# Patient Record
Sex: MALE | Race: WHITE | NOT HISPANIC OR LATINO | URBAN - METROPOLITAN AREA
[De-identification: names, ages, dates, MRNs, and addresses within clinical notes are randomized per-mention and may not be internally consistent; named-entity substitution may affect disease eponyms.]

---

## 2018-07-11 ENCOUNTER — INPATIENT (INPATIENT)
Facility: HOSPITAL | Age: 53
LOS: 6 days | Discharge: HOME | End: 2018-07-18
Attending: INTERNAL MEDICINE | Admitting: INTERNAL MEDICINE

## 2018-07-11 VITALS
OXYGEN SATURATION: 96 % | HEART RATE: 118 BPM | TEMPERATURE: 98 F | DIASTOLIC BLOOD PRESSURE: 104 MMHG | SYSTOLIC BLOOD PRESSURE: 158 MMHG | HEIGHT: 73 IN | WEIGHT: 265 LBS | RESPIRATION RATE: 20 BRPM

## 2018-07-11 LAB
APTT BLD: 27.4 SEC — SIGNIFICANT CHANGE UP (ref 27–39.2)
BASOPHILS # BLD AUTO: 0.05 K/UL — SIGNIFICANT CHANGE UP (ref 0–0.2)
BASOPHILS NFR BLD AUTO: 0.7 % — SIGNIFICANT CHANGE UP (ref 0–1)
EOSINOPHIL # BLD AUTO: 0.14 K/UL — SIGNIFICANT CHANGE UP (ref 0–0.7)
EOSINOPHIL NFR BLD AUTO: 1.9 % — SIGNIFICANT CHANGE UP (ref 0–8)
HCT VFR BLD CALC: 42.6 % — SIGNIFICANT CHANGE UP (ref 42–52)
HGB BLD-MCNC: 14.9 G/DL — SIGNIFICANT CHANGE UP (ref 14–18)
IMM GRANULOCYTES NFR BLD AUTO: 0.3 % — SIGNIFICANT CHANGE UP (ref 0.1–0.3)
INR BLD: 1.09 RATIO — SIGNIFICANT CHANGE UP (ref 0.65–1.3)
LACTATE SERPL-SCNC: 2 MMOL/L — SIGNIFICANT CHANGE UP (ref 0.5–2.2)
LYMPHOCYTES # BLD AUTO: 2.55 K/UL — SIGNIFICANT CHANGE UP (ref 1.2–3.4)
LYMPHOCYTES # BLD AUTO: 34.1 % — SIGNIFICANT CHANGE UP (ref 20.5–51.1)
MCHC RBC-ENTMCNC: 29.6 PG — SIGNIFICANT CHANGE UP (ref 27–31)
MCHC RBC-ENTMCNC: 35 G/DL — SIGNIFICANT CHANGE UP (ref 32–37)
MCV RBC AUTO: 84.5 FL — SIGNIFICANT CHANGE UP (ref 80–94)
MONOCYTES # BLD AUTO: 0.64 K/UL — HIGH (ref 0.1–0.6)
MONOCYTES NFR BLD AUTO: 8.6 % — SIGNIFICANT CHANGE UP (ref 1.7–9.3)
NEUTROPHILS # BLD AUTO: 4.07 K/UL — SIGNIFICANT CHANGE UP (ref 1.4–6.5)
NEUTROPHILS NFR BLD AUTO: 54.4 % — SIGNIFICANT CHANGE UP (ref 42.2–75.2)
PLATELET # BLD AUTO: 280 K/UL — SIGNIFICANT CHANGE UP (ref 130–400)
PROTHROM AB SERPL-ACNC: 11.8 SEC — SIGNIFICANT CHANGE UP (ref 9.95–12.87)
RBC # BLD: 5.04 M/UL — SIGNIFICANT CHANGE UP (ref 4.7–6.1)
RBC # FLD: 12.2 % — SIGNIFICANT CHANGE UP (ref 11.5–14.5)
WBC # BLD: 7.47 K/UL — SIGNIFICANT CHANGE UP (ref 4.8–10.8)
WBC # FLD AUTO: 7.47 K/UL — SIGNIFICANT CHANGE UP (ref 4.8–10.8)

## 2018-07-12 DIAGNOSIS — I10 ESSENTIAL (PRIMARY) HYPERTENSION: ICD-10-CM

## 2018-07-12 DIAGNOSIS — Z90.49 ACQUIRED ABSENCE OF OTHER SPECIFIED PARTS OF DIGESTIVE TRACT: Chronic | ICD-10-CM

## 2018-07-12 DIAGNOSIS — K92.2 GASTROINTESTINAL HEMORRHAGE, UNSPECIFIED: ICD-10-CM

## 2018-07-12 DIAGNOSIS — K57.92 DIVERTICULITIS OF INTESTINE, PART UNSPECIFIED, WITHOUT PERFORATION OR ABSCESS WITHOUT BLEEDING: ICD-10-CM

## 2018-07-12 DIAGNOSIS — E78.00 PURE HYPERCHOLESTEROLEMIA, UNSPECIFIED: ICD-10-CM

## 2018-07-12 DIAGNOSIS — E11.9 TYPE 2 DIABETES MELLITUS WITHOUT COMPLICATIONS: ICD-10-CM

## 2018-07-12 LAB
ALBUMIN SERPL ELPH-MCNC: 4.3 G/DL — SIGNIFICANT CHANGE UP (ref 3.5–5.2)
ALP SERPL-CCNC: 56 U/L — SIGNIFICANT CHANGE UP (ref 30–115)
ALT FLD-CCNC: 18 U/L — SIGNIFICANT CHANGE UP (ref 0–41)
ANION GAP SERPL CALC-SCNC: 13 MMOL/L — SIGNIFICANT CHANGE UP (ref 7–14)
ANION GAP SERPL CALC-SCNC: 15 MMOL/L — HIGH (ref 7–14)
APPEARANCE UR: CLEAR — SIGNIFICANT CHANGE UP
AST SERPL-CCNC: 18 U/L — SIGNIFICANT CHANGE UP (ref 0–41)
BASOPHILS # BLD AUTO: 0.05 K/UL — SIGNIFICANT CHANGE UP (ref 0–0.2)
BASOPHILS NFR BLD AUTO: 0.6 % — SIGNIFICANT CHANGE UP (ref 0–1)
BILIRUB SERPL-MCNC: 0.6 MG/DL — SIGNIFICANT CHANGE UP (ref 0.2–1.2)
BILIRUB UR-MCNC: NEGATIVE — SIGNIFICANT CHANGE UP
BUN SERPL-MCNC: 13 MG/DL — SIGNIFICANT CHANGE UP (ref 10–20)
BUN SERPL-MCNC: 14 MG/DL — SIGNIFICANT CHANGE UP (ref 10–20)
CALCIUM SERPL-MCNC: 8.4 MG/DL — LOW (ref 8.5–10.1)
CALCIUM SERPL-MCNC: 9.6 MG/DL — SIGNIFICANT CHANGE UP (ref 8.5–10.1)
CHLORIDE SERPL-SCNC: 95 MMOL/L — LOW (ref 98–110)
CHLORIDE SERPL-SCNC: 98 MMOL/L — SIGNIFICANT CHANGE UP (ref 98–110)
CK MB CFR SERPL CALC: 1.4 NG/ML — SIGNIFICANT CHANGE UP (ref 0.6–6.3)
CK SERPL-CCNC: 110 U/L — SIGNIFICANT CHANGE UP (ref 0–225)
CO2 SERPL-SCNC: 26 MMOL/L — SIGNIFICANT CHANGE UP (ref 17–32)
CO2 SERPL-SCNC: 27 MMOL/L — SIGNIFICANT CHANGE UP (ref 17–32)
COLOR SPEC: YELLOW — SIGNIFICANT CHANGE UP
CREAT SERPL-MCNC: 0.7 MG/DL — SIGNIFICANT CHANGE UP (ref 0.7–1.5)
CREAT SERPL-MCNC: 0.9 MG/DL — SIGNIFICANT CHANGE UP (ref 0.7–1.5)
DIFF PNL FLD: NEGATIVE — SIGNIFICANT CHANGE UP
EOSINOPHIL # BLD AUTO: 0.14 K/UL — SIGNIFICANT CHANGE UP (ref 0–0.7)
EOSINOPHIL NFR BLD AUTO: 1.7 % — SIGNIFICANT CHANGE UP (ref 0–8)
GLUCOSE SERPL-MCNC: 272 MG/DL — HIGH (ref 70–99)
GLUCOSE SERPL-MCNC: 303 MG/DL — HIGH (ref 70–99)
GLUCOSE UR QL: 100 MG/DL
HCT VFR BLD CALC: 32.1 % — LOW (ref 42–52)
HCT VFR BLD CALC: 35.5 % — LOW (ref 42–52)
HCT VFR BLD CALC: 37.5 % — LOW (ref 42–52)
HGB BLD-MCNC: 11.1 G/DL — LOW (ref 14–18)
HGB BLD-MCNC: 12.4 G/DL — LOW (ref 14–18)
HGB BLD-MCNC: 13.3 G/DL — LOW (ref 14–18)
IMM GRANULOCYTES NFR BLD AUTO: 0.4 % — HIGH (ref 0.1–0.3)
KETONES UR-MCNC: NEGATIVE — SIGNIFICANT CHANGE UP
LEUKOCYTE ESTERASE UR-ACNC: NEGATIVE — SIGNIFICANT CHANGE UP
LYMPHOCYTES # BLD AUTO: 2.64 K/UL — SIGNIFICANT CHANGE UP (ref 1.2–3.4)
LYMPHOCYTES # BLD AUTO: 32.2 % — SIGNIFICANT CHANGE UP (ref 20.5–51.1)
MCHC RBC-ENTMCNC: 29.7 PG — SIGNIFICANT CHANGE UP (ref 27–31)
MCHC RBC-ENTMCNC: 30.1 PG — SIGNIFICANT CHANGE UP (ref 27–31)
MCHC RBC-ENTMCNC: 34.9 G/DL — SIGNIFICANT CHANGE UP (ref 32–37)
MCHC RBC-ENTMCNC: 35.5 G/DL — SIGNIFICANT CHANGE UP (ref 32–37)
MCV RBC AUTO: 84.8 FL — SIGNIFICANT CHANGE UP (ref 80–94)
MCV RBC AUTO: 85.1 FL — SIGNIFICANT CHANGE UP (ref 80–94)
MONOCYTES # BLD AUTO: 0.69 K/UL — HIGH (ref 0.1–0.6)
MONOCYTES NFR BLD AUTO: 8.4 % — SIGNIFICANT CHANGE UP (ref 1.7–9.3)
NEUTROPHILS # BLD AUTO: 4.65 K/UL — SIGNIFICANT CHANGE UP (ref 1.4–6.5)
NEUTROPHILS NFR BLD AUTO: 56.7 % — SIGNIFICANT CHANGE UP (ref 42.2–75.2)
NITRITE UR-MCNC: NEGATIVE — SIGNIFICANT CHANGE UP
NRBC # BLD: 0 /100 WBCS — SIGNIFICANT CHANGE UP (ref 0–0)
PH UR: 6 — SIGNIFICANT CHANGE UP (ref 5–8)
PLATELET # BLD AUTO: 251 K/UL — SIGNIFICANT CHANGE UP (ref 130–400)
PLATELET # BLD AUTO: 259 K/UL — SIGNIFICANT CHANGE UP (ref 130–400)
POTASSIUM SERPL-MCNC: 3.7 MMOL/L — SIGNIFICANT CHANGE UP (ref 3.5–5)
POTASSIUM SERPL-MCNC: 3.8 MMOL/L — SIGNIFICANT CHANGE UP (ref 3.5–5)
POTASSIUM SERPL-SCNC: 3.7 MMOL/L — SIGNIFICANT CHANGE UP (ref 3.5–5)
POTASSIUM SERPL-SCNC: 3.8 MMOL/L — SIGNIFICANT CHANGE UP (ref 3.5–5)
PROT SERPL-MCNC: 6.9 G/DL — SIGNIFICANT CHANGE UP (ref 6–8)
PROT UR-MCNC: NEGATIVE MG/DL — SIGNIFICANT CHANGE UP
RBC # BLD: 4.17 M/UL — LOW (ref 4.7–6.1)
RBC # BLD: 4.42 M/UL — LOW (ref 4.7–6.1)
RBC # FLD: 12.1 % — SIGNIFICANT CHANGE UP (ref 11.5–14.5)
RBC # FLD: 12.2 % — SIGNIFICANT CHANGE UP (ref 11.5–14.5)
SODIUM SERPL-SCNC: 137 MMOL/L — SIGNIFICANT CHANGE UP (ref 135–146)
SODIUM SERPL-SCNC: 137 MMOL/L — SIGNIFICANT CHANGE UP (ref 135–146)
SP GR SPEC: 1.01 — SIGNIFICANT CHANGE UP (ref 1.01–1.03)
TROPONIN T SERPL-MCNC: <0.01 NG/ML — SIGNIFICANT CHANGE UP
TYPE + AB SCN PNL BLD: SIGNIFICANT CHANGE UP
UROBILINOGEN FLD QL: 0.2 MG/DL — SIGNIFICANT CHANGE UP (ref 0.2–0.2)
WBC # BLD: 7.85 K/UL — SIGNIFICANT CHANGE UP (ref 4.8–10.8)
WBC # BLD: 8.2 K/UL — SIGNIFICANT CHANGE UP (ref 4.8–10.8)
WBC # FLD AUTO: 7.85 K/UL — SIGNIFICANT CHANGE UP (ref 4.8–10.8)
WBC # FLD AUTO: 8.2 K/UL — SIGNIFICANT CHANGE UP (ref 4.8–10.8)

## 2018-07-12 RX ORDER — CEFEPIME 1 G/1
1000 INJECTION, POWDER, FOR SOLUTION INTRAMUSCULAR; INTRAVENOUS ONCE
Qty: 0 | Refills: 0 | Status: COMPLETED | OUTPATIENT
Start: 2018-07-12 | End: 2018-07-12

## 2018-07-12 RX ORDER — METRONIDAZOLE 500 MG
500 TABLET ORAL ONCE
Qty: 0 | Refills: 0 | Status: COMPLETED | OUTPATIENT
Start: 2018-07-12 | End: 2018-07-12

## 2018-07-12 RX ORDER — METFORMIN HYDROCHLORIDE 850 MG/1
0 TABLET ORAL
Qty: 0 | Refills: 0 | COMMUNITY

## 2018-07-12 RX ORDER — VALSARTAN 80 MG/1
40 TABLET ORAL DAILY
Qty: 0 | Refills: 0 | Status: DISCONTINUED | OUTPATIENT
Start: 2018-07-12 | End: 2018-07-14

## 2018-07-12 RX ORDER — CEFOTETAN DISODIUM 1 G
1 VIAL (EA) INJECTION EVERY 12 HOURS
Qty: 0 | Refills: 0 | Status: DISCONTINUED | OUTPATIENT
Start: 2018-07-12 | End: 2018-07-13

## 2018-07-12 RX ORDER — SODIUM CHLORIDE 9 MG/ML
1000 INJECTION INTRAMUSCULAR; INTRAVENOUS; SUBCUTANEOUS
Qty: 0 | Refills: 0 | Status: DISCONTINUED | OUTPATIENT
Start: 2018-07-12 | End: 2018-07-13

## 2018-07-12 RX ORDER — GEMFIBROZIL 600 MG
0 TABLET ORAL
Qty: 0 | Refills: 0 | COMMUNITY

## 2018-07-12 RX ORDER — AMLODIPINE BESYLATE 2.5 MG/1
2.5 TABLET ORAL DAILY
Qty: 0 | Refills: 0 | Status: DISCONTINUED | OUTPATIENT
Start: 2018-07-12 | End: 2018-07-13

## 2018-07-12 RX ORDER — CEFEPIME 1 G/1
1000 INJECTION, POWDER, FOR SOLUTION INTRAMUSCULAR; INTRAVENOUS EVERY 12 HOURS
Qty: 0 | Refills: 0 | Status: DISCONTINUED | OUTPATIENT
Start: 2018-07-12 | End: 2018-07-12

## 2018-07-12 RX ORDER — METRONIDAZOLE 500 MG
500 TABLET ORAL EVERY 8 HOURS
Qty: 0 | Refills: 0 | Status: DISCONTINUED | OUTPATIENT
Start: 2018-07-12 | End: 2018-07-12

## 2018-07-12 RX ORDER — SODIUM CHLORIDE 9 MG/ML
1000 INJECTION INTRAMUSCULAR; INTRAVENOUS; SUBCUTANEOUS
Qty: 0 | Refills: 0 | Status: DISCONTINUED | OUTPATIENT
Start: 2018-07-12 | End: 2018-07-12

## 2018-07-12 RX ADMIN — Medication 100 MILLIGRAM(S): at 06:02

## 2018-07-12 RX ADMIN — CEFEPIME 100 MILLIGRAM(S): 1 INJECTION, POWDER, FOR SOLUTION INTRAMUSCULAR; INTRAVENOUS at 06:02

## 2018-07-12 RX ADMIN — SODIUM CHLORIDE 75 MILLILITER(S): 9 INJECTION INTRAMUSCULAR; INTRAVENOUS; SUBCUTANEOUS at 22:16

## 2018-07-12 RX ADMIN — Medication 100 GRAM(S): at 17:23

## 2018-07-12 RX ADMIN — VALSARTAN 40 MILLIGRAM(S): 80 TABLET ORAL at 06:34

## 2018-07-12 RX ADMIN — SODIUM CHLORIDE 75 MILLILITER(S): 9 INJECTION INTRAMUSCULAR; INTRAVENOUS; SUBCUTANEOUS at 06:34

## 2018-07-12 RX ADMIN — AMLODIPINE BESYLATE 2.5 MILLIGRAM(S): 2.5 TABLET ORAL at 06:34

## 2018-07-12 NOTE — PROGRESS NOTE ADULT - SUBJECTIVE AND OBJECTIVE BOX
Patient was seen by GI  He is still with rectal bleeding  Hb 12.1 vital signs are stable,   GI recommended to transfer the patient to [facility] for close observation, possible IR for diverticular bleeding or colonoscopy.

## 2018-07-12 NOTE — H&P ADULT - HISTORY OF PRESENT ILLNESS
54yo male presents to the ER due to episodes of rectal bleeding since 1900 last night. Only associated symptom is a gurgling sensation in his stomach prior to each bowel movement. He denies abdominal pain, nausea, vomiting or fevers. No modifying factors except less bowel movements after ER intervention including iv antibiotics. He denies any history of diverticulitis but both his mother and brother have had it. Patient denies any use of blood thinners such as aspirin 54yo male presents to the ER due to episodes of rectal bleeding since 1900 last night including episodes in our ER. Only associated symptom is a gurgling sensation in his stomach prior to each bowel movement. He denies abdominal pain, nausea, vomiting or fevers. No modifying factors except less bowel movements after ER intervention including iv antibiotics. He denies any history of diverticulitis but both his mother and brother have had it. Patient denies any use of blood thinners such as aspirin.

## 2018-07-12 NOTE — CONSULT NOTE ADULT - SUBJECTIVE AND OBJECTIVE BOX
This is a   admitted with  GI BLEED  53 year old male patient with HTN,DM, Hypertriglyceridemia admitted with painless rectal bleeding since last night. has had 10-12 bloody BM in hospital. No previous episodes.            Current Health Issues  GI BLEED  ^RECTAL BLEED  Handoff  MEWS Score  Diabetes mellitus, type 2  Hypertension  High blood cholesterol  GI bleed  High blood cholesterol  Hypertension, unspecified type  Type 2 diabetes mellitus without complication, without long-term current use of insulin  Diverticulitis  Gastrointestinal hemorrhage, unspecified gastrointestinal hemorrhage type  S/P appendectomy  RECTAL BLEED  Diverticulitis          No Known Allergies          Home Medications:  gemfibrozil:  (12 Jul 2018 06:45)  metFORMIN:  (12 Jul 2018 06:45)  Tribenzor: just switched off this medication (12 Jul 2018 06:45)          FAMILY HISTORY:      REVIEW OF SYSTEMS      General:	Nil    Skin/Breast:  	  Ophthalmologic:  	  ENMT:	    Respiratory and Thorax:Nil  	  Cardiovascular:	Nil    Gastrointestinal:	    Genitourinary:	    Musculoskeletal:	    Neurological:	    Psychiatric:	    Hematology/Lymphatics:	    Endocrine:	    Allergic/Immunologic:	      PHYSICAL EXAM:    GENERAL:   in no distress    T(C): 37.1 (07-12-18 @ 13:02), Max: 37.1 (07-12-18 @ 13:02)  HR: 91 (07-12-18 @ 13:02) (85 - 118)  BP: 127/63 (07-12-18 @ 13:02) (124/84 - 158/104)  RR: 16 (07-12-18 @ 05:11) (16 - 20)  SpO2: 100% (07-12-18 @ 03:13) (96% - 100%)      HEENT:  NC/AT,  anicteric  CHEST:   no increased effort, breath sounds clear  HEART:  Regular rate and  rhythm  ABDOMEN:  Soft, non-tender, non-distended, normoactive bowel sounds,  no masses ,no hepato-splenomegaly, no signs of chronic liver disease  EXTREMETIES :  no clubbing cyanosis or edema    MICROBIOLOGY      DATA  CBC Full  -  ( 12 Jul 2018 06:49 )  WBC Count : 7.85 K/uL  Hemoglobin : 12.4 g/dL  Hematocrit : 35.5 %  Platelet Count - Automated : 259 K/uL  Mean Cell Volume : 85.1 fL  Mean Cell Hemoglobin : 29.7 pg  Mean Cell Hemoglobin Concentration : 34.9 g/dL  Auto Neutrophil # : x  Auto Lymphocyte # : x  Auto Monocyte # : x  Auto Eosinophil # : x  Auto Basophil # : x  Auto Neutrophil % : x  Auto Lymphocyte % : x  Auto Monocyte % : x  Auto Eosinophil % : x  Auto Basophil % : x      PT/INR - ( 11 Jul 2018 23:06 )   PT: 11.80 sec;   INR: 1.09 ratio         PTT - ( 11 Jul 2018 23:06 )  PTT:27.4 sec    07-12    137  |  98  |  13  ----------------------------<  272<H>  3.8   |  26  |  0.7    Ca    8.4<L>      12 Jul 2018 06:49    TPro  6.9  /  Alb  4.3  /  TBili  0.6  /  DBili  x   /  AST  18  /  ALT  18  /  AlkPhos  56  07-11                  < from: CT Angio Abdomen and Pelvis w/ IV Cont (07.12.18 @ 01:05) >    No CT evidence for intraluminal contrast extravasation to localize a site   of active GI bleed..    Very mild, uncomplicated, diverticulitis of the sigmoid colon.      < end of copied text >  < from: CT Angio Abdomen and Pelvis w/ IV Cont (07.12.18 @ 01:05) >    No CT evidence for intraluminal contrast extravasation to localize a site   of active GI bleed..    Very mild, uncomplicated, diverticulitis of the sigmoid colon.      < end of copied text >

## 2018-07-12 NOTE — H&P ADULT - NSHPLABSRESULTS_GEN_ALL_CORE
< from: CT Angio Abdomen and Pelvis w/ IV Cont (18 @ 01:05) >    ******PRELIMINARY REPORT******    ******PRELIMINARY REPORT******          EXAM:  CT ANGIO ABD PELV (W)AW IC          PROCEDURE DATE:  2018      IMPRESSION:     No evidence of an active arterial intraluminal hemorrhage.    Uncomplicated proximal sigmoid colon diverticulitis    ******PRELIMINARY REPORT******    ******PRELIMINARY REPORT******          SHALINI POTTER M.D., RESIDENT RADIOLOGIST    < end of copied text >                          13.3   8.20  )-----------( 251      ( 2018 02:37 )             37.5     07-    137  |  95<L>  |  14  ----------------------------<  303<H>  3.7   |  27  |  0.9    Ca    9.6      2018 23:06    TPro  6.9  /  Alb  4.3  /  TBili  0.6  /  DBili  x   /  AST  18  /  ALT  18  /  AlkPhos  56  07-11          Urinalysis Basic - ( 2018 23:45 )    Color: Yellow / Appearance: Clear / S.010 / pH: x  Gluc: x / Ketone: Negative  / Bili: Negative / Urobili: 0.2 mg/dL   Blood: x / Protein: Negative mg/dL / Nitrite: Negative   Leuk Esterase: Negative / RBC: x / WBC x   Sq Epi: x / Non Sq Epi: x / Bacteria: x      PT/INR - ( 2018 23:06 )   PT: 11.80 sec;   INR: 1.09 ratio         PTT - ( 2018 23:06 )  PTT:27.4 sec  Lactate Trend   @ 23:17 Lactate:2.0     CARDIAC MARKERS ( 2018 23:08 )  x     / <0.01 ng/mL / 110 U/L / x     / 1.4 ng/mL      CAPILLARY BLOOD GLUCOSE

## 2018-07-12 NOTE — ED PROVIDER NOTE - PHYSICAL EXAMINATION
CONSTITUTIONAL: Well-developed; well-nourished; in no acute distress.   SKIN: warm, dry  HEAD: Normocephalic; atraumatic.  EYES: PERRL, EOMI, no conjunctival erythema  ENT: No nasal discharge; airway clear.  NECK: Supple; non tender.  CARD: S1, S2 normal; no murmurs, gallops, or rubs. Regular rate and rhythm.   RESP: No wheezes, rales or rhonchi.  ABD: soft ntnd  VIVIANE: internal hemorrhoid palpated at 9 o clock,  no elpidio blood   EXT: Normal ROM.  No clubbing, cyanosis or edema.   NEURO: Alert, oriented, grossly unremarkable  PSYCH: Cooperative, appropriate.

## 2018-07-12 NOTE — ED PROVIDER NOTE - NS ED ROS FT
Eyes:  No visual changes, eye pain or discharge.  ENMT:  No hearing changes, pain, no sore throat or runny nose, no difficulty swallowing  Cardiac:  No chest pain, SOB or edema. No chest pain with exertion.  Respiratory:  No cough or respiratory distress. No hemoptysis. No history of asthma or RAD.  GI:  rectal bleeding   :  No dysuria, frequency or burning.  MS:  No myalgia, muscle weakness, joint pain or back pain.  Neuro:  No headache or weakness.  No LOC.  Skin:  No skin rash.   Endocrine: No history of thyroid disease or diabetes.

## 2018-07-12 NOTE — ED PROVIDER NOTE - OBJECTIVE STATEMENT
52 yo M w hxo f DM, HTN, c/o brbpr that started around 7 pm.  Not associated with n/v.  No abdominal pain.  No chest pain or sob.  no weakness or dizziness.  Never similar symptoms.  No colonoscopy.  No recent unintentional weight loss.  No change in caliber of stools

## 2018-07-12 NOTE — ED PROVIDER NOTE - ATTENDING CONTRIBUTION TO CARE
54 yo M PMHx DM, HTN presents with c/o bright red blood since this evening, multiple episodes including 2 in ED.  Pt states he has abd cramps and them goes to the bathroom.  no n/v, no fever or chills,  On exam pt in NAD AAO x 3, well appearing, abd is soft + BS, NT ND

## 2018-07-12 NOTE — CONSULT NOTE ADULT - SUBJECTIVE AND OBJECTIVE BOX
JUVENTINO PERALTA 53yMalePatient is a 53y old  Male who presents with a chief complaint of rectal bleed (2018 04:59)      Patient has history of:  No Known Allergies      PMH - DM   no prior colonoscopy    FSH - not contributory    ROS - not relevant     PHYSICAL EXAM  T(F): 96.9 (18 @ 05:11), Max: 97.9 (18 @ 22:32)  HR: 85 (18 @ 05:11) (85 - 118)  BP: 124/84 (18 @ 05:11) (124/84 - 158/104)  RR: 16 (18 @ 05:11) (16 - 20)  SpO2: 100% (18 @ 03:13) (96% - 100%)  Daily Height in cm: 185.42 (2018 05:11)    Daily   HEENT: normal, no nuchal rigidity  Cor: RSR Nl S1 S2  Lungs: clear  Abdomen: Nontender, Nl BS, obeses  Ext: No clubbing,cyanosis or edema    LAB & RADIOLOGIC RESULTS:                        13.3   8.20  )-----------( 251      ( 2018 02:37 )             37.5         07-    137  |  95<L>  |  14  ----------------------------<  303<H>  3.7   |  27  |  0.9    Ca    9.6      2018 23:06    TPro  6.9  /  Alb  4.3  /  TBili  0.6  /  DBili  x   /  AST  18  /  ALT  18  /  AlkPhos  56      Sodium, Serum: 137 mmol/L (18 @ 23:06)     Creatinine, Serum: 0.9 mg/dL (18 @ 23:06)  eGFR if Non African American: 98 mL/min/1.73M2 (18 @ 23:06)  eGFR if : 113 mL/min/1.73M2 (18 @ 23:06)    LIVER FUNCTIONS - ( 2018 23:06 )  Alb: 4.3 g/dL / Pro: 6.9 g/dL / ALK PHOS: 56 U/L / ALT: 18 U/L / AST: 18 U/L / GGT: x           PT/INR - ( 2018 23:06 )   PT: 11.80 sec;   INR: 1.09 ratio         PTT - ( 2018 23:06 )  PTT:27.4 sec  Patient Profile Adult [PRAVIN Jordan] (18 @ 04:59)  ED Provider Note [MIN Brewer M. DiMare] (18 @ 00:21)  ED ADULT Nurse Note [PARAS Phelan] (18 @ 23:26)  ED ADULT Triage Note [PATRICIA Hughes] (18 @ 22:32)      CAT Abdomen/Pelvis -  diverticulitis - mild         Urinalysis Basic - ( 2018 23:45 )    Color: Yellow / Appearance: Clear / S.010 / pH: x  Gluc: x / Ketone: Negative  / Bili: Negative / Urobili: 0.2 mg/dL   Blood: x / Protein: Negative mg/dL / Nitrite: Negative   Leuk Esterase: Negative / RBC: x / WBC x   Sq Epi: x / Non Sq Epi: x / Bacteria: x            IMPRESSION  Severe Sepsis (temp>101F, temp<96.8F, pulse>90 beats/min , resp rate>20/min, wbc>12, wbc<4, >10% bands, decreased systolic bp, lactic acidosis, acute kidney injury, metabolic encephalopathy ) due to suspected Gram negative pneumonia, suspected aspiration pneumonia, urinary tract infection      SUGGESTIONs  Await blood cultures, urine culture, wound culture  Await susceptibilities of  Continue Rocephin & Zithromax  Switch to Cefepime  Repeat blood cultures  Repeat sodium, white blood cell count    Echocardiogram

## 2018-07-12 NOTE — ED PROVIDER NOTE - PROGRESS NOTE DETAILS
pt continues to have multiple bloody bm in the ED signed out to Dr Blanchard pt w diverticular bleed, will start abx,  pt rejected by ICU, will place in low risk telemetry.  GI called, awaiting a call back Case endorsed to me by Dr. Holguin --pending CTA for BRBPR-- found to have diverticulitis, likely diverticular bleed. HR improved, repeat Hgb 13.3 from 14.9. Patient hemodynamically stable but given drop in Hgb case discussed with ICU on call attending who declined to admit, recommends admission to low risk tele. Patient admitted, abx given, GI paged x 2, will continue to attempt to contact as patient would likely benefit from scope within 12-24 hours. spoke to Dr. العراقي, will follow pt on the floor

## 2018-07-12 NOTE — CONSULT NOTE ADULT - PROBLEM SELECTOR RECOMMENDATION 9
mild  afebrile and normal WBC     GI / Surgical eval    IV to PO abx  - at least 10 days of abx  recall if needed

## 2018-07-12 NOTE — CONSULT NOTE ADULT - ASSESSMENT
Patient is still having 10-12 bloody BM today. Clinically stable.  Will like to [place] to [place] [facility] in anticipation of the need for Ir intervention Have discussed case with primary hospitalist earlier on in the day regarding transfer to [place]  If stops bleeding to consider colonoscopy after reviewing imaging with radiologist as CT indicates diverticulitis  Monitor CBC Q 8 hrly.

## 2018-07-13 LAB
BLD GP AB SCN SERPL QL: SIGNIFICANT CHANGE UP
CULTURE RESULTS: NO GROWTH — SIGNIFICANT CHANGE UP
HCT VFR BLD CALC: 23.6 % — LOW (ref 42–52)
HCT VFR BLD CALC: 25.7 % — LOW (ref 42–52)
HGB BLD-MCNC: 8.2 G/DL — LOW (ref 14–18)
HGB BLD-MCNC: 9.1 G/DL — LOW (ref 14–18)
MCHC RBC-ENTMCNC: 29.4 PG — SIGNIFICANT CHANGE UP (ref 27–31)
MCHC RBC-ENTMCNC: 29.9 PG — SIGNIFICANT CHANGE UP (ref 27–31)
MCHC RBC-ENTMCNC: 34.7 G/DL — SIGNIFICANT CHANGE UP (ref 32–37)
MCHC RBC-ENTMCNC: 35.4 G/DL — SIGNIFICANT CHANGE UP (ref 32–37)
MCV RBC AUTO: 84.5 FL — SIGNIFICANT CHANGE UP (ref 80–94)
MCV RBC AUTO: 84.6 FL — SIGNIFICANT CHANGE UP (ref 80–94)
NRBC # BLD: 0 /100 WBCS — SIGNIFICANT CHANGE UP (ref 0–0)
NRBC # BLD: 0 /100 WBCS — SIGNIFICANT CHANGE UP (ref 0–0)
PLATELET # BLD AUTO: 195 K/UL — SIGNIFICANT CHANGE UP (ref 130–400)
PLATELET # BLD AUTO: 218 K/UL — SIGNIFICANT CHANGE UP (ref 130–400)
RBC # BLD: 2.79 M/UL — LOW (ref 4.7–6.1)
RBC # BLD: 3.04 M/UL — LOW (ref 4.7–6.1)
RBC # FLD: 12.1 % — SIGNIFICANT CHANGE UP (ref 11.5–14.5)
RBC # FLD: 12.4 % — SIGNIFICANT CHANGE UP (ref 11.5–14.5)
SPECIMEN SOURCE: SIGNIFICANT CHANGE UP
TYPE + AB SCN PNL BLD: SIGNIFICANT CHANGE UP
WBC # BLD: 7 K/UL — SIGNIFICANT CHANGE UP (ref 4.8–10.8)
WBC # BLD: 8.84 K/UL — SIGNIFICANT CHANGE UP (ref 4.8–10.8)
WBC # FLD AUTO: 7 K/UL — SIGNIFICANT CHANGE UP (ref 4.8–10.8)
WBC # FLD AUTO: 8.84 K/UL — SIGNIFICANT CHANGE UP (ref 4.8–10.8)

## 2018-07-13 RX ORDER — SODIUM CHLORIDE 9 MG/ML
1000 INJECTION, SOLUTION INTRAVENOUS
Qty: 0 | Refills: 0 | Status: DISCONTINUED | OUTPATIENT
Start: 2018-07-13 | End: 2018-07-18

## 2018-07-13 RX ORDER — DEXTROSE 50 % IN WATER 50 %
15 SYRINGE (ML) INTRAVENOUS ONCE
Qty: 0 | Refills: 0 | Status: DISCONTINUED | OUTPATIENT
Start: 2018-07-13 | End: 2018-07-18

## 2018-07-13 RX ORDER — VALSARTAN 80 MG/1
1 TABLET ORAL
Qty: 0 | Refills: 0 | COMMUNITY
Start: 2018-07-13

## 2018-07-13 RX ORDER — PANTOPRAZOLE SODIUM 20 MG/1
1 TABLET, DELAYED RELEASE ORAL
Qty: 30 | Refills: 0 | OUTPATIENT
Start: 2018-07-13 | End: 2018-08-11

## 2018-07-13 RX ORDER — INSULIN GLARGINE 100 [IU]/ML
10 INJECTION, SOLUTION SUBCUTANEOUS AT BEDTIME
Qty: 0 | Refills: 0 | Status: DISCONTINUED | OUTPATIENT
Start: 2018-07-13 | End: 2018-07-14

## 2018-07-13 RX ORDER — DEXTROSE 50 % IN WATER 50 %
25 SYRINGE (ML) INTRAVENOUS ONCE
Qty: 0 | Refills: 0 | Status: DISCONTINUED | OUTPATIENT
Start: 2018-07-13 | End: 2018-07-18

## 2018-07-13 RX ORDER — INSULIN LISPRO 100/ML
VIAL (ML) SUBCUTANEOUS
Qty: 0 | Refills: 0 | Status: DISCONTINUED | OUTPATIENT
Start: 2018-07-13 | End: 2018-07-14

## 2018-07-13 RX ORDER — FERROUS SULFATE 325(65) MG
325 TABLET ORAL DAILY
Qty: 0 | Refills: 0 | Status: DISCONTINUED | OUTPATIENT
Start: 2018-07-13 | End: 2018-07-16

## 2018-07-13 RX ORDER — GLUCAGON INJECTION, SOLUTION 0.5 MG/.1ML
1 INJECTION, SOLUTION SUBCUTANEOUS ONCE
Qty: 0 | Refills: 0 | Status: DISCONTINUED | OUTPATIENT
Start: 2018-07-13 | End: 2018-07-18

## 2018-07-13 RX ORDER — FERROUS SULFATE 325(65) MG
1 TABLET ORAL
Qty: 30 | Refills: 0 | OUTPATIENT
Start: 2018-07-13 | End: 2018-08-11

## 2018-07-13 RX ORDER — DOCUSATE SODIUM 100 MG
1 CAPSULE ORAL
Qty: 60 | Refills: 0 | OUTPATIENT
Start: 2018-07-13 | End: 2018-08-11

## 2018-07-13 RX ORDER — INSULIN LISPRO 100/ML
3 VIAL (ML) SUBCUTANEOUS
Qty: 0 | Refills: 0 | Status: DISCONTINUED | OUTPATIENT
Start: 2018-07-13 | End: 2018-07-14

## 2018-07-13 RX ORDER — PANTOPRAZOLE SODIUM 20 MG/1
40 TABLET, DELAYED RELEASE ORAL
Qty: 0 | Refills: 0 | Status: DISCONTINUED | OUTPATIENT
Start: 2018-07-13 | End: 2018-07-14

## 2018-07-13 RX ORDER — DOCUSATE SODIUM 100 MG
100 CAPSULE ORAL
Qty: 0 | Refills: 0 | Status: DISCONTINUED | OUTPATIENT
Start: 2018-07-13 | End: 2018-07-16

## 2018-07-13 RX ORDER — DEXTROSE 50 % IN WATER 50 %
12.5 SYRINGE (ML) INTRAVENOUS ONCE
Qty: 0 | Refills: 0 | Status: DISCONTINUED | OUTPATIENT
Start: 2018-07-13 | End: 2018-07-18

## 2018-07-13 RX ADMIN — Medication 100 GRAM(S): at 06:00

## 2018-07-13 RX ADMIN — PANTOPRAZOLE SODIUM 40 MILLIGRAM(S): 20 TABLET, DELAYED RELEASE ORAL at 12:47

## 2018-07-13 RX ADMIN — SODIUM CHLORIDE 75 MILLILITER(S): 9 INJECTION INTRAMUSCULAR; INTRAVENOUS; SUBCUTANEOUS at 11:38

## 2018-07-13 RX ADMIN — Medication 325 MILLIGRAM(S): at 23:31

## 2018-07-13 RX ADMIN — Medication 1 TABLET(S): at 23:31

## 2018-07-13 RX ADMIN — VALSARTAN 40 MILLIGRAM(S): 80 TABLET ORAL at 06:00

## 2018-07-13 RX ADMIN — Medication 100 MILLIGRAM(S): at 23:31

## 2018-07-13 RX ADMIN — AMLODIPINE BESYLATE 2.5 MILLIGRAM(S): 2.5 TABLET ORAL at 06:00

## 2018-07-13 RX ADMIN — INSULIN GLARGINE 10 UNIT(S): 100 INJECTION, SOLUTION SUBCUTANEOUS at 23:30

## 2018-07-13 NOTE — DISCHARGE NOTE ADULT - CARE PROVIDERS DIRECT ADDRESSES
,[email or web address]

## 2018-07-13 NOTE — PROGRESS NOTE ADULT - ASSESSMENT
52yo male with history of HTN, DLD, DM presented to [facility] ER on 11 july at around 11:30 pm due to episodes of rectal bleeding that started on same day since 7:00 pm. He had further 10-12 episodes of BRBPR associated with a drop in Hb from 14.9 to 12.7.  His vital signs were stable. Pt underwent CT Angio of abdomen which did not show any Intraluminal GI bleeding but revealed a very mild sigmoid diverticulitis.  He was transferred from [facility] to [place] site in anticipation that if he continues to bleed he might need an IR intervention.  Currently pt denies any BRBPR since last night. His last BM was yesterday at around 8:00 pm where he reported one episode of dark black coloured stool with no bright red blood.     Painless Lower GI Bleed likely sec to diverticulosis- resolved   Keep Npo for now   continue to trend CBC, transfuse as needed.  Will defer colonoscopy for now as pt has mild diverticulitis which increases the risk of perforation. If he rebleeds pt might possibly need embolization by IR  Will rec colonoscopy once diverticulitis is resolved    Mild Uncomplicated diverticulitis-  C/w IV ABX as per ID    Will discuss with attending.

## 2018-07-13 NOTE — PROGRESS NOTE ADULT - SUBJECTIVE AND OBJECTIVE BOX
HPI  52yo male with history of HTN, DLD, DM presented to  ER on 11 july at around 11:30 pm due to episodes of rectal bleeding that started on same day since 1900. He had further episodes of BRBPR in our ER. Only associated symptom is a gurgling sensation in his stomach prior to each bowel movement. He denies abdominal pain, nausea, vomiting or fevers. No prior history of GI bleeding. Pt never had EGD and colonoscopy.  He denies any history of diverticulitis in the past. Patient denies any use of NSAIDS and blood thinners such as aspirin.   He was initially admitted to [facility] where he continued to have BRBPR underwent CT Angio which did not show any intraluminal bleeding but showed very mild diverticulitis in sigmoid colon. Pt was started on IV antibiotics.   Yesterday pt also reported one episode of dark black coloured stool with no bright red blood.   Currently pt denies any BRBPR in the last 12 hours.       PAST MEDICAL & SURGICAL HISTORY  Diabetes mellitus, type 2  Hypertension  High blood cholesterol  S/P appendectomy      ALLERGIES:  No Known Allergies      MEDICATIONS:  MEDICATIONS  (STANDING):  amLODIPine   Tablet 2.5 milliGRAM(s) Oral daily  cefoTEtan  IVPB 1 Gram(s) IV Intermittent every 12 hours  sodium chloride 0.9%. 1000 milliLiter(s) (75 mL/Hr) IV Continuous <Continuous>  valsartan 40 milliGRAM(s) Oral daily    MEDICATIONS  (PRN):      REVIEW OF SYSTEMS:    CONSTITUTIONAL: No weakness, fatigue, malaise, fevers or chills, no weight change, appetite change  Throat: No Dysphagia, No Odynophagia  RESPIRATORY: No SOB  CARDIOVASCULAR: No chest pain   Muscloskeletal: no joints pain  NEUROLOGICAL: No syncope or diziness  SKIN: No pruritis  Heme/Lymph: no LN enlargement         VITALS:   T(F): 97.9 (07-13 @ 05:10), Max: 98.8 (07-12 @ 21:00)  HR: 92 (07-13 @ 05:10) (84 - 118)  BP: 126/95 (07-13 @ 05:10) (121/78 - 158/104)  BP(mean): --  RR: 20 (07-13 @ 05:10) (16 - 20)  SpO2: 97% (07-12 @ 22:16) (96% - 100%)        PHYSICAL EXAM:  EYES: No scleral icterus   LUNG: Clear to auscultation bilaterally; No rales, rhonchi, wheezing, or rubs  HEART: RRR; No murmurs  ABDOMEN: Soft, +BS, NO  Abdominal Tenderness, No guarding, No Sánchez Sign   Rectal Exam: Rectal vault empty , finger stained Bright red blood, no masses palpable.        Blood Work :                        12.4   7.85  )-----------( 259      ( 12 Jul 2018 06:49 )             35.5     PT/INR - ( 11 Jul 2018 23:06 )  INR: 1.09          PTT - ( 11 Jul 2018 23:06 )  PTT:27.4   07-12    137  |  98  |  13  ----------------------------<  272<H>  3.8   |  26  |  0.7    Ca    8.4<L>      12 Jul 2018 06:49      CBC -  ( 12 Jul 2018 06:49 )  Hemoglobin : 12.4    CBC -  ( 12 Jul 2018 04:50 )  Hemoglobin : 11.1    CBC -  ( 12 Jul 2018 02:37 )  Hemoglobin : 13.3    CBC -  ( 11 Jul 2018 23:06 )  Hemoglobin : 14.9      LIVER FUNCTIONS - ( 11 Jul 2018 23:06 )  Alb: 4.3 [3.5 - 5.2] / Pro: 6.9 [6.0 - 8.0] / ALK PHOS: 56 [30 - 115] / ALT: 18 [0 - 41] / AST: 18 [0 - 41] / GGT: x         RADIOLOGY:  < from: CT Angio Abdomen and Pelvis w/ IV Cont (07.12.18 @ 01:05) >  No CT evidence for intraluminal contrast extravasation to localize a site   of active GI bleed..    Very mild, uncomplicated, diverticulitis of the sigmoid colon.    < end of copied text >

## 2018-07-13 NOTE — DISCHARGE NOTE ADULT - PLAN OF CARE
Control bleeding Follow up with gastroenterologist within 1 week of discharge, schedule colonoscopy in 6 weeks. Adjust diet as discussed, with moderate fruits and vegetables and adequate hydration. Continue iron and multivitamin as above. Complete resolution and prevention of recurrence Rectal bleeding stopped and Hb is stable  Follow up with gastroenterologist within 1 week of discharge, schedule colonoscopy in 6 weeks. Adjust diet as discussed, high in fiber with moderate fruits and vegetables and adequate hydration.

## 2018-07-13 NOTE — DISCHARGE NOTE ADULT - MEDICATION SUMMARY - MEDICATIONS TO TAKE
I will START or STAY ON the medications listed below when I get home from the hospital:    valsartan 40 mg oral tablet  -- 1 tab(s) by mouth once a day  -- Indication: For Hypertension    metFORMIN  -- Indication: For Diabetes mellitus, type 2    gemfibrozil  -- Indication: For DLD    Tribenzor  -- just switched off this medication  -- Indication: For Diabetes mellitus, type 2

## 2018-07-13 NOTE — PROGRESS NOTE ADULT - SUBJECTIVE AND OBJECTIVE BOX
FABIAN JUVENTINO  53y  Male  ***My note supercedes ALL resident notes that I sign.  My corrections for their notes are in my note.***    I can be reached directly on [id number]. My office number is [phone]. My personal cell number is [phone].    PMD - Kb Peloro    INTERVAL EVENTS: Had long d/w pt and girlfriend re diverticulosis. There is zero evid pt has diverticulitis, clinically. Last bleed was 9pm last night.     T(F): 98.1 (18 @ 14:27), Max: 98.8 (18 @ 21:00)  HR: 98 (18 @ 14:27) (92 - 105)  BP: 123/68 (18 @ 14:27) (121/78 - 126/95)  RR: 18 (18 @ 14:27) (18 - 20)  SpO2: 96% (18 @ 08:21) (96% - 97%)    PHYSICAL EXAM:  EYES: No scleral icterus   neck no nodes  LUNG: Clear to auscultation bilaterally;  HEART: RRR; No murmurs  ABDOMEN: Soft, +BS, NO  Abdominal Tenderness, No guarding, No masses  ext no edema, no c/c  neuro - cn intact; motor/sens intact, gait nl    LABS:                        9.1     (    84.5   8.84  )-----------( ---------      218      ( 2018 07:38 )             25.7    (    12.4     Hemoglobin: 9.1 g/dL ( @ 07:38) - prob dilutional from IVFs; bleeding stopped almost 24 hrs now  Hemoglobin: 12.4 g/dL ( @ 06:49)  Hemoglobin: 11.1 g/dL ( @ 04:50)  Hemoglobin: 13.3 g/dL ( @ 02:37)  Hemoglobin: 14.9 g/dL ( @ 23:06)    PT/INR - ( 2018 23:06 )   PT: 11.80 sec;   INR: 1.09 ratio    PTT - ( 2018 23:06 )  PTT:27.4 sec    CARDIAC MARKERS ( 2018 23:08 )  x     / <0.01 ng/mL / 110 U/L / x     / 1.4 ng/mL    Urinalysis Basic - ( 2018 23:45 )    Color: Yellow / Appearance: Clear / S.010 / pH: x  Gluc: x / Ketone: Negative  / Bili: Negative / Urobili: 0.2 mg/dL   Blood: x / Protein: Negative mg/dL / Nitrite: Negative   Leuk Esterase: Negative / RBC: x / WBC x   Sq Epi: x / Non Sq Epi: x / Bacteria: x    RADIOLOGY & ADDITIONAL TESTS:  < from: CT Angio Abdomen and Pelvis w/ IV Cont (18 @ 01:05) >  EXAM:  CT ANGIO ABD PELV (W)AW IC          *** ADDENDUM 2018  ***    Upon further evaluation, the previously noted mild inflammation around   the sigmoid colon is likely related to prominent vasa recta.    There is no evidence of diverticulitis.    < end of copied text >  < from: CT Angio Abdomen and Pelvis w/ IV Cont (18 @ 01:05) >  IMPRESSION:     No CT evidencefor intraluminal contrast extravasation to localize a site   of active GI bleed..    < end of copied text >      MEDICATIONS:    insulin glargine Injectable (LANTUS) 10 Unit(s) SubCutaneous at bedtime  insulin lispro (HumaLOG) corrective regimen sliding scale   SubCutaneous three times a day before meals  insulin lispro Injectable (HumaLOG) 3 Unit(s) SubCutaneous three times a day before meals  pantoprazole    Tablet 40 milliGRAM(s) Oral before breakfast  valsartan 40 milliGRAM(s) Oral daily

## 2018-07-13 NOTE — DISCHARGE NOTE ADULT - OTHER SIGNIFICANT FINDINGS
GEN Lying in no acute distress  HEENT Pupils equal and reactive to light and accommodationSupple Neck  PULM Clear to auscultation bilaterally  CV s1s2 regular rate and rhythm  GI + bowel sounds nontnender, obese  EXT no cyanosis or edema  PSYCH awake alert and oriented x 3  INTEG No Lesions  NEURO PENA

## 2018-07-13 NOTE — DISCHARGE NOTE ADULT - PATIENT PORTAL LINK FT
You can access the [facility] Patient Portal, offered by [facility], by registering with the following website: [email or web address]

## 2018-07-13 NOTE — PROGRESS NOTE ADULT - ASSESSMENT
# NO DIVERTICULITIS - stop IVFs; stop ABX  pt can be scoped as outpt at anytime, see no need to wait 6 weeks and so no rush to do c-scope (it could wait 6 wks, too)    # Anemia is from acute blood loss and dilutional  d/c IVfs  fe 325mg po q24, colace 100 q12, MVI q24 til hgb > 10    # LGIB - likely diverticulosis  decent fiber diet; oral hydration  begin clears  adv frankie as jessy  if no more bloody Bms and Hgb rising, then d/c home    # DM T2 no compl  insulin while here  back to usual home meds  recent A1c 6.9    # Morbid Obesity  lose wt    # HTN  on valsartan - ok to use    Anticipate d/c home tomorrow

## 2018-07-13 NOTE — DISCHARGE NOTE ADULT - HOSPITAL COURSE
54yo male with history of HTN, DLD, DM presented to  ER on 11 july at around 11:30 pm due to episodes of rectal bleeding that started on same day since 1900. He had further episodes of BRBPR in our ER. Only associated symptom is a gurgling sensation in his stomach prior to each bowel movement. He denies abdominal pain, nausea, vomiting or fevers. No prior history of GI bleeding. Pt never had EGD and colonoscopy.  He denies any history of diverticulitis in the past. Patient denies any use of NSAIDS and blood thinners such as aspirin.   He was initially admitted to [facility] where he continued to have BRBPR underwent CT Angio which did not show any intraluminal bleeding but showed very mild diverticulitis in sigmoid colon. Pt was started on IV antibiotics. CT scan was reviewed again, likely diverticulosis without diverticulitis. 7/12 pt also reported one episode of dark black coloured stool with no bright red blood, no episodes since. Hb is stable.   Patient is hemodynamically stable for discharge. 54yo male presents to the ER due to episodes of rectal bleeding 10-12 episodes. He denies abdominal pain, nausea, vomiting or fevers. CT angio 7/12 was neg for active bleeding site. EGD 7/17- no bleed. Colonoscopy 7/17- diverticulosis, no active bleed. Hgb stable 7.8. s/p 3 U RBC 7/17. If active bleed-get CT angio w/ IV contrast again and call IR or inpatient capsule endoscopy to eval small bowel. Check CBC 2x/day. Transfuse if hgb <7 or symptomatic. Patient had 2 BM today that were darker, less bloody and more formed. We are advancing his diet to full liquid.    # NO DIVERTICULITIS - stop ABX    # Anemia is from acute blood loss; hgb stable 7.8  If hgb <7 or symptomatic, transfuse. s/p 3 U RBC 7/17  d/w GI- protonix 40 qd po  If active bleed-get CT angio w/ IV contrast again and call IR  or capsule endoscopy to eval small bowel  advance to clear liquid diet  Possible outpt capsule endoscopy if no bleed  CT angio 7/12 was neg for active bleeding site  EGD 7/17- no bleed  colonoscopy 7/17- diverticulosis, no active bleed    MVI q24 til hgb > 10  check cbc 2x/day  OOB to chair  Echo- no echo on record      # DM T2 no complicated  lant 15 - might need to inc as FS not controlled (but also not eating)  hold lispro until eating again  back to usual home meds on d/c  recent A1c 6.9      # HTN  hold valsartan until BP stable 52yo male presents to [facility] ED due to episodes of rectal bleeding 10-12 episodes. He denied abdominal pain, nausea, vomiting or fevers. CT angio 7/12 was neg for active bleeding site. EGD 7/17- no bleed. Colonoscopy 7/17- diverticulosis, no active bleed.     During hospitalization, patient was upgraded to CCU due to low Hb of 7.2 with symptoms. He received a total of 5 units of pRBCs. Rectal bleeding has stopped and his Hb has been stable at 8.2.    # No diverticulitis - no antibiotics    # Rectal bleeding / Anemia is from acute blood loss; hgb stable 8.2  - Outpatient  GI follow up with possible capsule endoscopy  - CT angio 7/12 was neg for active bleeding site  - EGD 7/17- no bleed  - Colonoscopy 7/17- diverticulosis, no active bleed    - MVI q24 til hgb >10  - Repeat CBC in 1 week    # DM T2 no complicated  - home meds on d/c  - recent A1c 6.9      # HTN  hold valsartan until BP stable    Patient tolerated normal diet today, had normal formed bowel movements without any elpidio blood and is currently hemodynamically stable for discharge    FOLLOW UP with PMD within one week, and GI within 2 weeks.

## 2018-07-13 NOTE — DISCHARGE NOTE ADULT - CARE PROVIDER_API CALL
Kiko Gray), Gastroenterology; Internal Medicine  [address] [place] [address]  Phone: [phone]  Fax: [phone] Candido Dunham), Gastroenterology; Internal Medicine  [address] [place] [address]  Phone: [phone]  Fax: [phone]    Tristin Murillo), [address] [place], [place] [address]  [address] [place] [address]  Phone: [phone]  Fax: [phone]

## 2018-07-13 NOTE — PROGRESS NOTE ADULT - ATTENDING COMMENTS
Pt seen and examined with GI fellow.  The active diverticulitis seen on CT scan precludes safe colonoscopy within six weeks.  I would recommend treatment of same with colonoscopy in six weeks to evaluate area for occult colon cancer.

## 2018-07-13 NOTE — DISCHARGE NOTE ADULT - CARE PLAN
Principal Discharge DX:	Diverticulosis of colon with hemorrhage  Goal:	Control bleeding  Assessment and plan of treatment:	Follow up with gastroenterologist within 1 week of discharge, schedule colonoscopy in 6 weeks. Adjust diet as discussed, with moderate fruits and vegetables and adequate hydration. Continue iron and multivitamin as above. Principal Discharge DX:	Diverticulosis of colon with hemorrhage  Goal:	Complete resolution and prevention of recurrence  Assessment and plan of treatment:	Follow up with gastroenterologist within 1 week of discharge, schedule colonoscopy in 6 weeks. Adjust diet as discussed, with moderate fruits and vegetables and adequate hydration. Continue iron and multivitamin as above. Principal Discharge DX:	Diverticulosis of colon with hemorrhage  Goal:	Complete resolution and prevention of recurrence  Assessment and plan of treatment:	Rectal bleeding stopped and Hb is stable  Follow up with gastroenterologist within 1 week of discharge, schedule colonoscopy in 6 weeks. Adjust diet as discussed, high in fiber with moderate fruits and vegetables and adequate hydration.

## 2018-07-13 NOTE — PROGRESS NOTE ADULT - ASSESSMENT
SUBJECTIVE:    Patient is a 53y old Male who presents with a chief complaint of rectal bleed (2018 04:59)  Currently admitted to medicine with the primary diagnosis of GI bleed  Today is hospital day 1d. This morning he is resting comfortably in bed and reports no new issues or overnight events.     PAST MEDICAL & SURGICAL HISTORY  Diabetes mellitus, type 2  Hypertension  High blood cholesterol  S/P appendectomy    SOCIAL HISTORY:  Negative for smoking/alcohol/drug use.     ALLERGIES:  No Known Allergies    MEDICATIONS:  STANDING MEDICATIONS  dextrose 5%. 1000 milliLiter(s) IV Continuous <Continuous>  dextrose 50% Injectable 12.5 Gram(s) IV Push once  dextrose 50% Injectable 25 Gram(s) IV Push once  dextrose 50% Injectable 25 Gram(s) IV Push once  insulin glargine Injectable (LANTUS) 10 Unit(s) SubCutaneous at bedtime  insulin lispro (HumaLOG) corrective regimen sliding scale   SubCutaneous three times a day before meals  insulin lispro Injectable (HumaLOG) 3 Unit(s) SubCutaneous three times a day before meals  pantoprazole    Tablet 40 milliGRAM(s) Oral before breakfast  valsartan 40 milliGRAM(s) Oral daily    PRN MEDICATIONS  dextrose 40% Gel 15 Gram(s) Oral once PRN  glucagon  Injectable 1 milliGRAM(s) IntraMuscular once PRN    VITALS:   T(F): 98.1  HR: 98  BP: 123/68  RR: 18  SpO2: 96%    LABS:                        9.1    8.84  )-----------( 218      ( 2018 07:38 )             25.7     07-12    137  |  98  |  13  ----------------------------<  272<H>  3.8   |  26  |  0.7    Ca    8.4<L>      2018 06:49    TPro  6.9  /  Alb  4.3  /  TBili  0.6  /  DBili  x   /  AST  18  /  ALT  18  /  AlkPhos  56  07-11    PT/INR - ( 2018 23:06 )   PT: 11.80 sec;   INR: 1.09 ratio         PTT - ( 2018 23:06 )  PTT:27.4 sec  Urinalysis Basic - ( 2018 23:45 )    Color: Yellow / Appearance: Clear / S.010 / pH: x  Gluc: x / Ketone: Negative  / Bili: Negative / Urobili: 0.2 mg/dL   Blood: x / Protein: Negative mg/dL / Nitrite: Negative   Leuk Esterase: Negative / RBC: x / WBC x   Sq Epi: x / Non Sq Epi: x / Bacteria: x            CARDIAC MARKERS ( 2018 23:08 )  x     / <0.01 ng/mL / 110 U/L / x     / 1.4 ng/mL      RADIOLOGY:    PHYSICAL EXAM:  GEN: No acute distress  LUNGS: Clear to auscultation bilaterally   HEART: Regular  ABD: Soft, non-tender, non-distended.  EXT: NC/NC/NE/2+PP/PENA/Skin Intact.   NEURO: AAOX3    Intravenous access:   NG tube:   Drew Catheter: SUBJECTIVE:    Patient is a 53y old Male who presents with a chief complaint of rectal bleed (2018 04:59)  Currently admitted to medicine with the primary diagnosis of GI bleed  Today is hospital day 1d. This morning he is resting comfortably in bed and reports no new issues or overnight events. He has had 1 bloody BM last night at 9 pm, darker blood than previous times, and no more BM since. Patient denies feeling dizzy on standing, denies weakness, SOB. No nausea, vomiting. Patient has never had BRBPR before this wednesday.     PAST MEDICAL & SURGICAL HISTORY  Diabetes mellitus, type 2  Hypertension  High blood cholesterol  S/P appendectomy    SOCIAL HISTORY:  Negative for smoking/alcohol/drug use.     ALLERGIES:  No Known Allergies    MEDICATIONS:  STANDING MEDICATIONS  dextrose 5%. 1000 milliLiter(s) IV Continuous <Continuous>  dextrose 50% Injectable 12.5 Gram(s) IV Push once  dextrose 50% Injectable 25 Gram(s) IV Push once  dextrose 50% Injectable 25 Gram(s) IV Push once  insulin glargine Injectable (LANTUS) 10 Unit(s) SubCutaneous at bedtime  insulin lispro (HumaLOG) corrective regimen sliding scale   SubCutaneous three times a day before meals  insulin lispro Injectable (HumaLOG) 3 Unit(s) SubCutaneous three times a day before meals  pantoprazole    Tablet 40 milliGRAM(s) Oral before breakfast  valsartan 40 milliGRAM(s) Oral daily    PRN MEDICATIONS  dextrose 40% Gel 15 Gram(s) Oral once PRN  glucagon  Injectable 1 milliGRAM(s) IntraMuscular once PRN    VITALS:   T(F): 98.1  HR: 98  BP: 123/68  RR: 18  SpO2: 96%    LABS:                        9.1    8.84  )-----------( 218      ( 2018 07:38 )             25.7     07-12    137  |  98  |  13  ----------------------------<  272<H>  3.8   |  26  |  0.7    Ca    8.4<L>      2018 06:49    TPro  6.9  /  Alb  4.3  /  TBili  0.6  /  DBili  x   /  AST  18  /  ALT  18  /  AlkPhos  56  07-11    PT/INR - ( 2018 23:06 )   PT: 11.80 sec;   INR: 1.09 ratio         PTT - ( 2018 23:06 )  PTT:27.4 sec  Urinalysis Basic - ( 2018 23:45 )    Color: Yellow / Appearance: Clear / S.010 / pH: x  Gluc: x / Ketone: Negative  / Bili: Negative / Urobili: 0.2 mg/dL   Blood: x / Protein: Negative mg/dL / Nitrite: Negative   Leuk Esterase: Negative / RBC: x / WBC x   Sq Epi: x / Non Sq Epi: x / Bacteria: x            CARDIAC MARKERS ( 2018 23:08 )  x     / <0.01 ng/mL / 110 U/L / x     / 1.4 ng/mL      RADIOLOGY:  < from: CT Angio Abdomen and Pelvis w/ IV Cont (18 @ 01:05) >  *** ADDENDUM 2018  ***  Upon further evaluation, the previously noted mild inflammation around   the sigmoid colon is likely related to prominent vasa recta.  There is no evidence of diverticulitis.  *** END OF ADDENDUM 2018  ***  PERITONEUM/MESENTERY/BOWEL: Diverticulosis. Very mild pericolic fat   stranding along the sigmoid colon. Findings likely reflect a very mild   diverticulitis. No pneumoperitoneum or bowel obstruction. No evidence of   an intraluminal hemorrhage.  iMPRESSION:   No CT evidencefor intraluminal contrast extravasation to localize a site   of active GI bleed..  Very mild, uncomplicated, diverticulitis of the sigmoid colon.  < end of copied text >    PHYSICAL EXAM:  GEN: No acute distress, lying comfortably in bed  LUNGS: Clear to auscultation bilaterally   HEART: Regular rate and rhythm.   ABD: Soft, non-tender, non-distended. BS+   EXT: Full ROM bilaterally.   NEURO: AAOX3    Intravenous access: L arm  NG tube: None  Drew Catheter: None    54 yo M w hxo f DM, HTN, c/o brbpr that started around 7 pm.  Not associated with n/v.  No abdominal pain.  No chest pain or sob.  no weakness or dizziness.  Never similar symptoms.  No colonoscopy.  No recent unintentional weight loss.  No change in caliber of stools.  1. BRBPR  -Hb dropped from 14.9 yest to 12.4, then to 9.1. Likely dilutional factor present  -f/u  HH, if stable or starts increasing, and no more bleed, pt can go home w/ OP colonoscopy in 6 weeks.   -Clear liquid diet (carb consistent, advance as tolerated)  -CBC q8h  - GI will not scope this admission, b/c concern of diverticulitis. see CT read above.   -Keep Hb >7, PRBCs.  -dc'd abx.     2. DM  -monitor FS  -Keep -180,   -, 220, 213.  - insulin naive, start insulin 10+3, holding metformin  -f/u FS, adjust insulin accordingly  3. DLD  -atorvastatin 80mg     DVT PPx -Pt is ambulating  Dispo: home once HH stable or increasing, and no more bleed

## 2018-07-14 LAB
ALBUMIN SERPL ELPH-MCNC: 3.4 G/DL — LOW (ref 3.5–5.2)
ALP SERPL-CCNC: 45 U/L — SIGNIFICANT CHANGE UP (ref 30–115)
ALT FLD-CCNC: 15 U/L — SIGNIFICANT CHANGE UP (ref 0–41)
ANION GAP SERPL CALC-SCNC: 11 MMOL/L — SIGNIFICANT CHANGE UP (ref 7–14)
AST SERPL-CCNC: 24 U/L — SIGNIFICANT CHANGE UP (ref 0–41)
BILIRUB SERPL-MCNC: 0.4 MG/DL — SIGNIFICANT CHANGE UP (ref 0.2–1.2)
BLD GP AB SCN SERPL QL: SIGNIFICANT CHANGE UP
BUN SERPL-MCNC: 7 MG/DL — LOW (ref 10–20)
CALCIUM SERPL-MCNC: 8.1 MG/DL — LOW (ref 8.5–10.1)
CHLORIDE SERPL-SCNC: 100 MMOL/L — SIGNIFICANT CHANGE UP (ref 98–110)
CO2 SERPL-SCNC: 28 MMOL/L — SIGNIFICANT CHANGE UP (ref 17–32)
CREAT SERPL-MCNC: 0.7 MG/DL — SIGNIFICANT CHANGE UP (ref 0.7–1.5)
ESTIMATED AVERAGE GLUCOSE: 194 MG/DL — HIGH (ref 68–114)
GLUCOSE SERPL-MCNC: 194 MG/DL — HIGH (ref 70–99)
HBA1C BLD-MCNC: 8.4 % — HIGH (ref 4–5.6)
HCT VFR BLD CALC: 19.5 % — LOW (ref 42–52)
HCT VFR BLD CALC: 23.1 % — LOW (ref 42–52)
HGB BLD-MCNC: 7.2 G/DL — CRITICAL LOW (ref 14–18)
HGB BLD-MCNC: 8.1 G/DL — LOW (ref 14–18)
MCHC RBC-ENTMCNC: 29.5 PG — SIGNIFICANT CHANGE UP (ref 27–31)
MCHC RBC-ENTMCNC: 30.2 PG — SIGNIFICANT CHANGE UP (ref 27–31)
MCHC RBC-ENTMCNC: 35.1 G/DL — SIGNIFICANT CHANGE UP (ref 32–37)
MCHC RBC-ENTMCNC: 35.9 G/DL — SIGNIFICANT CHANGE UP (ref 32–37)
MCV RBC AUTO: 84 FL — SIGNIFICANT CHANGE UP (ref 80–94)
MCV RBC AUTO: 84.1 FL — SIGNIFICANT CHANGE UP (ref 80–94)
NRBC # BLD: 0 /100 WBCS — SIGNIFICANT CHANGE UP (ref 0–0)
NRBC # BLD: 0 /100 WBCS — SIGNIFICANT CHANGE UP (ref 0–0)
PLATELET # BLD AUTO: 192 K/UL — SIGNIFICANT CHANGE UP (ref 130–400)
PLATELET # BLD AUTO: 202 K/UL — SIGNIFICANT CHANGE UP (ref 130–400)
POTASSIUM SERPL-MCNC: 3.8 MMOL/L — SIGNIFICANT CHANGE UP (ref 3.5–5)
POTASSIUM SERPL-SCNC: 3.8 MMOL/L — SIGNIFICANT CHANGE UP (ref 3.5–5)
PROT SERPL-MCNC: 5.4 G/DL — LOW (ref 6–8)
RBC # BLD: 2.32 M/UL — LOW (ref 4.7–6.1)
RBC # BLD: 2.75 M/UL — LOW (ref 4.7–6.1)
RBC # FLD: 12.3 % — SIGNIFICANT CHANGE UP (ref 11.5–14.5)
RBC # FLD: 12.4 % — SIGNIFICANT CHANGE UP (ref 11.5–14.5)
SODIUM SERPL-SCNC: 139 MMOL/L — SIGNIFICANT CHANGE UP (ref 135–146)
TYPE + AB SCN PNL BLD: SIGNIFICANT CHANGE UP
WBC # BLD: 5.73 K/UL — SIGNIFICANT CHANGE UP (ref 4.8–10.8)
WBC # BLD: 6.37 K/UL — SIGNIFICANT CHANGE UP (ref 4.8–10.8)
WBC # FLD AUTO: 5.73 K/UL — SIGNIFICANT CHANGE UP (ref 4.8–10.8)
WBC # FLD AUTO: 6.37 K/UL — SIGNIFICANT CHANGE UP (ref 4.8–10.8)

## 2018-07-14 RX ORDER — INSULIN GLARGINE 100 [IU]/ML
15 INJECTION, SOLUTION SUBCUTANEOUS AT BEDTIME
Qty: 0 | Refills: 0 | Status: DISCONTINUED | OUTPATIENT
Start: 2018-07-14 | End: 2018-07-18

## 2018-07-14 RX ORDER — PANTOPRAZOLE SODIUM 20 MG/1
40 TABLET, DELAYED RELEASE ORAL
Qty: 0 | Refills: 0 | Status: DISCONTINUED | OUTPATIENT
Start: 2018-07-14 | End: 2018-07-14

## 2018-07-14 RX ORDER — INSULIN LISPRO 100/ML
VIAL (ML) SUBCUTANEOUS
Qty: 0 | Refills: 0 | Status: DISCONTINUED | OUTPATIENT
Start: 2018-07-14 | End: 2018-07-14

## 2018-07-14 RX ORDER — PANTOPRAZOLE SODIUM 20 MG/1
8 TABLET, DELAYED RELEASE ORAL
Qty: 80 | Refills: 0 | Status: DISCONTINUED | OUTPATIENT
Start: 2018-07-14 | End: 2018-07-16

## 2018-07-14 RX ORDER — INSULIN LISPRO 100/ML
5 VIAL (ML) SUBCUTANEOUS
Qty: 0 | Refills: 0 | Status: DISCONTINUED | OUTPATIENT
Start: 2018-07-14 | End: 2018-07-14

## 2018-07-14 RX ADMIN — Medication 325 MILLIGRAM(S): at 13:11

## 2018-07-14 RX ADMIN — Medication 1 TABLET(S): at 13:11

## 2018-07-14 RX ADMIN — Medication 2: at 13:10

## 2018-07-14 RX ADMIN — Medication 100 MILLIGRAM(S): at 05:22

## 2018-07-14 RX ADMIN — Medication 3: at 18:38

## 2018-07-14 RX ADMIN — Medication 5 UNIT(S): at 18:39

## 2018-07-14 RX ADMIN — PANTOPRAZOLE SODIUM 40 MILLIGRAM(S): 20 TABLET, DELAYED RELEASE ORAL at 06:05

## 2018-07-14 RX ADMIN — Medication 5 UNIT(S): at 13:10

## 2018-07-14 RX ADMIN — INSULIN GLARGINE 15 UNIT(S): 100 INJECTION, SOLUTION SUBCUTANEOUS at 21:51

## 2018-07-14 RX ADMIN — PANTOPRAZOLE SODIUM 10 MG/HR: 20 TABLET, DELAYED RELEASE ORAL at 22:12

## 2018-07-14 RX ADMIN — VALSARTAN 40 MILLIGRAM(S): 80 TABLET ORAL at 05:22

## 2018-07-14 NOTE — PROGRESS NOTE ADULT - ASSESSMENT
# NO DIVERTICULITIS - stop IVFs; stop ABX    # Anemia is from acute blood loss and dilutional; hgb fell again  d/c IVfs  fe 325mg po q24, colace 100 q12, MVI q24 til hgb > 10  check cbc 2x/day  if starting to go up and no bloody BM, then defer c-scope to oupt  if hgb cont to fall, then will ask GI to re-eval  prev CT angio was neg for active bleeding site    # LGIB - likely diverticulosis  decent fiber diet; oral hydration  adv diet to carb consist    # DM T2 no complicated  lant 15 and lisp 5/meal  back to usual home meds on d/c  recent A1c 6.9    # Morbid Obesity  lose wt    # HTN  on valsartan - ok to use    Anticipate d/c home soon

## 2018-07-14 NOTE — CHART NOTE - NSCHARTNOTEFT_GEN_A_CORE
CCU UpGRADE NOTE:    53y Male transferred to CCU from floor.   Patient is a 53y old Male who presents with a chief complaint of rectal bleed (13 Jul 2018 21:50)  The patient is currently admitted for the primary diagnosis of Diverticulosis of colon with hemorrhage. PMH includes HTN, DLD, DM presented to  ER on 11 july at around 11:30 pm due to 10-12 episodes of rectal bleeding that started on same day since 19:00. He had further episodes of BRBPR in our ER.  He denies abdominal pain, nausea, vomiting or fevers. No prior history of GI bleeding. Pt never had EGD and colonoscopy.  He denies any history of diverticulitis in the past. Patient denies any use of NSAIDS and blood thinners such as aspirin. He was initially admitted to [facility] where he continued to have BRBPR underwent CT Angio which did not show any intraluminal bleeding but showed very mild diverticulitis in sigmoid colon. Pt was started on IV antibiotics, which were discontinued here b/c low suspicion of diverticulitis. Likely diverticulosis. Yesterday pt also reported one episode of dark black coloured stool with no bright red blood.   Today, patient had 4 episodes of severe hematochezia, with bright red blood . Patient is symptomatic with palpitations and dizziness on standing.      Indwelling vascular catheters:    Urinary Catheter:     Disposition:    Code Status:    ICU COURSE OF EVENTS:  -------------------------------------------------------------------------------------------        -------------------------------------------------------------------------------------------    Current workup in progress:    SIGN OUT AT 07-14-18 @ 21:37 GIVEN TO: CCU UpGRADE NOTE:    53y Male transferred to CCU from floor.   Patient is a 53y old Male who presents with a chief complaint of rectal bleed (13 Jul 2018 21:50)  The patient is currently admitted for the primary diagnosis of Diverticulosis of colon with hemorrhage. PMH includes HTN, DLD, DM presented to  ER on 11 july at around 11:30 pm due to 10-12 episodes of rectal bleeding that started on same day since 19:00. He had further episodes of BRBPR in our ER.  He denies abdominal pain, nausea, vomiting or fevers. No prior history of GI bleeding. Pt never had EGD and colonoscopy.  He denies any history of diverticulitis in the past. Patient denies any use of NSAIDS and blood thinners such as aspirin. He was initially admitted to [facility] where he continued to have BRBPR underwent CT Angio which did not show any intraluminal bleeding but showed very mild diverticulitis in sigmoid colon. Pt was started on IV antibiotics, which were discontinued here b/c low suspicion of diverticulitis. Likely diverticulosis. Yesterday pt also reported one episode of dark black coloured stool with no bright red blood.   Today, patient had 4 episodes of severe hematochezia, with bright red blood, last one at 21:30.  Patient is symptomatic with palpitations and dizziness on standing. Lying down, /59 HR 96. Standing /57, . Patient will receive 2 U PRBC in ICU, after which he will get CT angio.       Indwelling vascular catheters:  2 18 G IV in L and R forearm.     Disposition: Home    Code Status: Full Code    ICU COURSE OF EVENTS: CCU UpGRADE NOTE:    53y Male transferred to CCU from floor.   Patient is a 53y old Male who presents with a chief complaint of rectal bleed (13 Jul 2018 21:50)  The patient is currently admitted for the primary diagnosis of Diverticulosis of colon with hemorrhage. PMH includes HTN, DLD, DM presented to  ER on 11 july at around 11:30 pm due to 10-12 episodes of rectal bleeding that started on same day since 19:00. He had further episodes of BRBPR in our ER.  He denies abdominal pain, nausea, vomiting or fevers. No prior history of GI bleeding. Pt never had EGD and colonoscopy.  He denies any history of diverticulitis in the past. Patient denies any use of NSAIDS and blood thinners such as aspirin. He was initially admitted to [facility] where he continued to have BRBPR underwent CT Angio which did not show any intraluminal bleeding but showed very mild diverticulitis in sigmoid colon. Pt was started on IV antibiotics, which were discontinued here b/c low suspicion of diverticulitis. Likely diverticulosis. Yesterday pt also reported one episode of dark black coloured stool with no bright red blood.   Today, patient had 8 episodes of severe hematochezia, 3 since 19:00, with bright red blood, last one at 21:30.  Orthostatics positive. Patient is symptomatic with palpitations and dizziness on standing. Lying down, /59 HR 96. Standing /57, . Patient will receive 2 U PRBC in ICU, after which he will get CT angio. If CT angio is positive for extravasation, IR (informed) will consider embolization. Surgery is aware of patient.     PAST MEDICAL & SURGICAL HISTORY  Diabetes mellitus, type 2  Hypertension  High blood cholesterol  S/P appendectomy    SOCIAL HISTORY:  Negative for smoking/alcohol/drug use.     ALLERGIES:  No Known Allergies    MEDICATIONS:  STANDING MEDICATIONS  dextrose 5%. 1000 milliLiter(s) IV Continuous <Continuous>  dextrose 50% Injectable 12.5 Gram(s) IV Push once  dextrose 50% Injectable 25 Gram(s) IV Push once  dextrose 50% Injectable 25 Gram(s) IV Push once  insulin glargine Injectable (LANTUS) 10 Unit(s) SubCutaneous at bedtime  insulin lispro (HumaLOG) corrective regimen sliding scale   SubCutaneous three times a day before meals  insulin lispro Injectable (HumaLOG) 3 Unit(s) SubCutaneous three times a day before meals  pantoprazole    Tablet 40 milliGRAM(s) Oral before breakfast  valsartan 40 milliGRAM(s) Oral daily    PRN MEDICATIONS  dextrose 40% Gel 15 Gram(s) Oral once PRN  glucagon  Injectable 1 milliGRAM(s) IntraMuscular once PRN    VITALS:   T(F): 99.5  HR: 104  BP: 139/73  RR: 17  SpO2: 97%    LABS:                        7.2    6.37  )-----------( 192      ( 14 Jul 2018 18:14 )             19.5     07-14    139  |  100  |  7<L>  ----------------------------<  194<H>  3.8   |  28  |  0.7    Ca    8.1<L>      14 Jul 2018 08:08    TPro  5.4<L>  /  Alb  3.4<L>  /  TBili  0.4  /  DBili  x   /  AST  24  /  ALT  15  /  AlkPhos  45  07-14    Culture - Urine (collected 11 Jul 2018 23:45)  Source: .Urine Clean Catch (Midstream)  Final Report (13 Jul 2018 22:51):    No growth    RADIOLOGY:  < from: CT Angio Abdomen and Pelvis w/ IV Cont (07.12.18 @ 01:05) >  *** ADDENDUM 07/13/2018  ***  Upon further evaluation, the previously noted mild inflammation around   the sigmoid colon is likely related to prominent vasa recta.  There is no evidence of diverticulitis.  *** END OF ADDENDUM 07/13/2018  ***  PERITONEUM/MESENTERY/BOWEL: Diverticulosis. Very mild pericolic fat   stranding along the sigmoid colon. Findings likely reflect a very mild   diverticulitis. No pneumoperitoneum or bowel obstruction. No evidence of   an intraluminal hemorrhage.  IMPRESSION:   No CT evidencefor intraluminal contrast extravasation to localize a site   of active GI bleed..  Very mild, uncomplicated, diverticulitis of the sigmoid colon.  < end of copied text >    PHYSICAL EXAM:  GEN: No acute distress, pale on appearance.   LUNGS: Clear to auscultation bilaterally   HEART: Tachycardia  ABD: Soft, non-tender, non-distended.  EXT: Full ROM bilaterally.   NEURO: AAOX3      Indwelling vascular catheters:  2 18 G IV in L and R forearm.       54 yo M w hxo f DM, HTN, c/o brbpr that started around 7 pm.  Not associated with n/v.  No abdominal pain.  No chest pain or sob.  no weakness or dizziness.  Never similar symptoms.  No colonoscopy.  No recent unintentional weight loss.  No change in caliber of stools.  1. BRBPR  -Hb dropped from 14.9 yest to 7.2  -f/u  HH at 20:00 and 4:30  -2 U PRBC to be given, CT angio after PRBC  -F/u CT angio, call IR after exam is complete.   - 2 18 G IV present  -NPO  -CBC q8h  - f/u GI  -Keep Hb >7, PRBCs.  -IR, Surgery, GI informed    2. DM  -monitor FS  -Keep -180,   -, 220, 213.  - insulin naive, start insulin 15+3, holding metformin. Hold lispro for NPO. 15 U given at 22:00  -f/u FS, adjust insulin accordingly    3. DLD  -atorvastatin 80mg     DVT PPx -Pt is ambulating  Dispo: home once HH stable or increasing, and no more bleed   Disposition: Home    Code Status: Full Code        ICU COURSE OF EVENTS:

## 2018-07-14 NOTE — PROGRESS NOTE ADULT - SUBJECTIVE AND OBJECTIVE BOX
JUVENTINO PERALTA  53y  Male  ***My note supercedes ALL resident notes that I sign.  My corrections for their notes are in my note.***    I can be reached directly on [employer] [id number]. My office number is [phone]. My personal cell number is [phone].    INTERVAL EVENTS: Pt had a soft BM that was dark, but no red blood. He has able to see more of the clear toilet water, where previously the bleeding had turned it entirely red. Pt feels well. He has no pain and would like to try to eat.    T(F): 98.1 (07-14-18 @ 05:50), Max: 98.4 (07-13-18 @ 19:43)  HR: 83 (07-14-18 @ 05:50) (83 - 98)  BP: 108/56 (07-14-18 @ 05:50) (108/56 - 126/62)  RR: 20 (07-14-18 @ 05:50) (18 - 20)  SpO2: 96% (07-13-18 @ 08:21) (96% - 96%)    CAPILLARY BLOOD GLUCOSE  204 (07-13-18 @ 21:46)  175 (07-13-18 @ 16:30)  213 (07-13-18 @ 12:18)  226 (07-13-18 @ 07:54)  213 (07-13-18 @ 04:23)  220 (07-12-18 @ 21:41)  203 (07-12-18 @ 16:22)  239 (07-12-18 @ 11:30)    PHYSICAL EXAM:  EYES: No scleral icterus   neck no nodes  LUNG: Clear to auscultation bilaterally;  HEART: RRR; No murmurs  ABDOMEN: Soft, +BS, NO  Abdominal Tenderness, No guarding, No masses  ext no edema, no c/c  neuro - cn intact; motor/sens intact, gait nl    LABS:                        8.2     (    84.6   7.00  )-----------( ---------      195      ( 13 Jul 2018 21:15 )             23.6    (    12.1     Hemoglobin: 8.2 g/dL (07-13 @ 21:15) - continues to fall; despite no bleeding for 36 hours  Hemoglobin: 9.1 g/dL (07-13 @ 07:38)  Hemoglobin: 12.4 g/dL (07-12 @ 06:49)  Hemoglobin: 11.1 g/dL (07-12 @ 04:50)  Hemoglobin: 13.3 g/dL (07-12 @ 02:37)  Hemoglobin: 14.9 g/dL (07-11 @ 23:06)    RADIOLOGY & ADDITIONAL TESTS:      MEDICATIONS:    docusate sodium 100 milliGRAM(s) Oral two times a day  ferrous    sulfate 325 milliGRAM(s) Oral daily  glucagon  Injectable 1 milliGRAM(s) IntraMuscular once PRN  insulin glargine Injectable (LANTUS) 10 Unit(s) SubCutaneous at bedtime  insulin lispro (HumaLOG) corrective regimen sliding scale   SubCutaneous three times a day before meals  insulin lispro Injectable (HumaLOG) 3 Unit(s) SubCutaneous three times a day before meals  multivitamin 1 Tablet(s) Oral daily  pantoprazole    Tablet 40 milliGRAM(s) Oral before breakfast  valsartan 40 milliGRAM(s) Oral daily

## 2018-07-15 LAB
ANION GAP SERPL CALC-SCNC: 13 MMOL/L — SIGNIFICANT CHANGE UP (ref 7–14)
BASOPHILS # BLD AUTO: 0.02 K/UL — SIGNIFICANT CHANGE UP (ref 0–0.2)
BASOPHILS NFR BLD AUTO: 0.4 % — SIGNIFICANT CHANGE UP (ref 0–1)
BUN SERPL-MCNC: 10 MG/DL — SIGNIFICANT CHANGE UP (ref 10–20)
CALCIUM SERPL-MCNC: 7.8 MG/DL — LOW (ref 8.5–10.1)
CHLORIDE SERPL-SCNC: 101 MMOL/L — SIGNIFICANT CHANGE UP (ref 98–110)
CO2 SERPL-SCNC: 25 MMOL/L — SIGNIFICANT CHANGE UP (ref 17–32)
CREAT SERPL-MCNC: 0.7 MG/DL — SIGNIFICANT CHANGE UP (ref 0.7–1.5)
EOSINOPHIL # BLD AUTO: 0.11 K/UL — SIGNIFICANT CHANGE UP (ref 0–0.7)
EOSINOPHIL NFR BLD AUTO: 2.1 % — SIGNIFICANT CHANGE UP (ref 0–8)
GLUCOSE SERPL-MCNC: 167 MG/DL — HIGH (ref 70–99)
HCT VFR BLD CALC: 21.7 % — LOW (ref 42–52)
HCT VFR BLD CALC: 22.1 % — LOW (ref 42–52)
HGB BLD-MCNC: 7.6 G/DL — LOW (ref 14–18)
HGB BLD-MCNC: 7.8 G/DL — LOW (ref 14–18)
IMM GRANULOCYTES NFR BLD AUTO: 0.9 % — HIGH (ref 0.1–0.3)
LYMPHOCYTES # BLD AUTO: 1.71 K/UL — SIGNIFICANT CHANGE UP (ref 1.2–3.4)
LYMPHOCYTES # BLD AUTO: 32.1 % — SIGNIFICANT CHANGE UP (ref 20.5–51.1)
MAGNESIUM SERPL-MCNC: 2 MG/DL — SIGNIFICANT CHANGE UP (ref 1.8–2.4)
MCHC RBC-ENTMCNC: 29.7 PG — SIGNIFICANT CHANGE UP (ref 27–31)
MCHC RBC-ENTMCNC: 30.1 PG — SIGNIFICANT CHANGE UP (ref 27–31)
MCHC RBC-ENTMCNC: 35 G/DL — SIGNIFICANT CHANGE UP (ref 32–37)
MCHC RBC-ENTMCNC: 35.3 G/DL — SIGNIFICANT CHANGE UP (ref 32–37)
MCV RBC AUTO: 84.8 FL — SIGNIFICANT CHANGE UP (ref 80–94)
MCV RBC AUTO: 85.3 FL — SIGNIFICANT CHANGE UP (ref 80–94)
MONOCYTES # BLD AUTO: 0.35 K/UL — SIGNIFICANT CHANGE UP (ref 0.1–0.6)
MONOCYTES NFR BLD AUTO: 6.6 % — SIGNIFICANT CHANGE UP (ref 1.7–9.3)
NEUTROPHILS # BLD AUTO: 3.08 K/UL — SIGNIFICANT CHANGE UP (ref 1.4–6.5)
NEUTROPHILS NFR BLD AUTO: 57.9 % — SIGNIFICANT CHANGE UP (ref 42.2–75.2)
NRBC # BLD: 0 /100 WBCS — SIGNIFICANT CHANGE UP (ref 0–0)
NRBC # BLD: 0 /100 WBCS — SIGNIFICANT CHANGE UP (ref 0–0)
PHOSPHATE SERPL-MCNC: 3.1 MG/DL — SIGNIFICANT CHANGE UP (ref 2.1–4.9)
PLATELET # BLD AUTO: 193 K/UL — SIGNIFICANT CHANGE UP (ref 130–400)
PLATELET # BLD AUTO: 193 K/UL — SIGNIFICANT CHANGE UP (ref 130–400)
POTASSIUM SERPL-MCNC: 3.4 MMOL/L — LOW (ref 3.5–5)
POTASSIUM SERPL-SCNC: 3.4 MMOL/L — LOW (ref 3.5–5)
RBC # BLD: 2.56 M/UL — LOW (ref 4.7–6.1)
RBC # BLD: 2.59 M/UL — LOW (ref 4.7–6.1)
RBC # FLD: 12.9 % — SIGNIFICANT CHANGE UP (ref 11.5–14.5)
RBC # FLD: 12.9 % — SIGNIFICANT CHANGE UP (ref 11.5–14.5)
SODIUM SERPL-SCNC: 139 MMOL/L — SIGNIFICANT CHANGE UP (ref 135–146)
TROPONIN T SERPL-MCNC: <0.01 NG/ML — SIGNIFICANT CHANGE UP
TROPONIN T SERPL-MCNC: <0.01 NG/ML — SIGNIFICANT CHANGE UP
WBC # BLD: 5.32 K/UL — SIGNIFICANT CHANGE UP (ref 4.8–10.8)
WBC # BLD: 6.74 K/UL — SIGNIFICANT CHANGE UP (ref 4.8–10.8)
WBC # FLD AUTO: 5.32 K/UL — SIGNIFICANT CHANGE UP (ref 4.8–10.8)
WBC # FLD AUTO: 6.74 K/UL — SIGNIFICANT CHANGE UP (ref 4.8–10.8)

## 2018-07-15 RX ORDER — SODIUM CHLORIDE 9 MG/ML
1000 INJECTION INTRAMUSCULAR; INTRAVENOUS; SUBCUTANEOUS
Qty: 0 | Refills: 0 | Status: DISCONTINUED | OUTPATIENT
Start: 2018-07-15 | End: 2018-07-18

## 2018-07-15 RX ORDER — POTASSIUM CHLORIDE 20 MEQ
20 PACKET (EA) ORAL
Qty: 0 | Refills: 0 | Status: COMPLETED | OUTPATIENT
Start: 2018-07-15 | End: 2018-07-15

## 2018-07-15 RX ADMIN — Medication 1 TABLET(S): at 13:18

## 2018-07-15 RX ADMIN — SODIUM CHLORIDE 75 MILLILITER(S): 9 INJECTION INTRAMUSCULAR; INTRAVENOUS; SUBCUTANEOUS at 12:30

## 2018-07-15 RX ADMIN — PANTOPRAZOLE SODIUM 10 MG/HR: 20 TABLET, DELAYED RELEASE ORAL at 05:40

## 2018-07-15 RX ADMIN — Medication 325 MILLIGRAM(S): at 13:19

## 2018-07-15 RX ADMIN — INSULIN GLARGINE 15 UNIT(S): 100 INJECTION, SOLUTION SUBCUTANEOUS at 22:06

## 2018-07-15 RX ADMIN — Medication 50 MILLIEQUIVALENT(S): at 12:35

## 2018-07-15 RX ADMIN — Medication 100 MILLIGRAM(S): at 17:12

## 2018-07-15 RX ADMIN — Medication 50 MILLIEQUIVALENT(S): at 09:28

## 2018-07-15 RX ADMIN — PANTOPRAZOLE SODIUM 10 MG/HR: 20 TABLET, DELAYED RELEASE ORAL at 08:21

## 2018-07-15 RX ADMIN — Medication 50 MILLIEQUIVALENT(S): at 07:23

## 2018-07-15 NOTE — PROVIDER CONTACT NOTE (OTHER) - SITUATION
pt had bright red Bowel movement  bp 147/79  Md made aware  no intervention at this time pt denies acute distress, will cont monitoring

## 2018-07-15 NOTE — PROGRESS NOTE ADULT - SUBJECTIVE AND OBJECTIVE BOX
JUVENTINO PERALTA  [id number]  53y Male    Indication for ICU admission: Anemia 2/2 GI bleed  Admit Date:   ICU Date:     Allergies: No Known Allergies    PMHx: PAST MEDICAL & SURGICAL HISTORY:  Diabetes mellitus, type 2  Hypertension  High blood cholesterol  S/P appendectomy    Home Medications: Home Medications:  gemfibrozil:  (2018 06:45)  metFORMIN:  (2018 06:45)  Tribenzor: just switched off this medication (2018 06:45)  valsartan 40 mg oral tablet: 1 tab(s) orally once a day (2018 21:53)    24HRS EVENT:  52yo brbpr since wed, cta on admission showed no extravasation. HD stable.   hg 14>7,   s/p 2pRBC, Hb pending  Denies abdominal pain, nausea, vomiting or fevers. No prior history of GI bleeding. Pt never had EGD and colonoscopy.  He denies any history of diverticulitis in the past. Patient denies any use of NSAIDS and blood thinners such as aspirin.     REVIEW OF SYSTEMS    [X] A ten-point review of systems was otherwise negative except as noted.  [ ] Due to altered mental status/intubation, subjective information were not able to be obtained from the patient. History was obtained, to the extent possible, from review of the chart and collateral sources of information.  Daily     Daily Weight in k.2 (2018 22:10)  Diet, NPO (18 @ 20:51)    CURRENT MEDS:  Gastrointestinal Medications  dextrose 5%. 1000 milliLiter(s) IV Continuous <Continuous>  docusate sodium 100 milliGRAM(s) Oral two times a day  ferrous    sulfate 325 milliGRAM(s) Oral daily  multivitamin 1 Tablet(s) Oral daily  pantoprazole Infusion 8 mG/Hr IV Continuous <Continuous>  potassium chloride  20 mEq/100 mL IVPB 20 milliEquivalent(s) IV Intermittent every 2 hours    Endocrine/Metabolic Medications  dextrose 40% Gel 15 Gram(s) Oral once PRN Blood Glucose LESS THAN 70 milliGRAM(s)/deciliter  dextrose 50% Injectable 12.5 Gram(s) IV Push once  dextrose 50% Injectable 25 Gram(s) IV Push once  dextrose 50% Injectable 25 Gram(s) IV Push once  glucagon  Injectable 1 milliGRAM(s) IntraMuscular once PRN Glucose LESS THAN 70 milligrams/deciliter  insulin glargine Injectable (LANTUS) 15 Unit(s) SubCutaneous at bedtime    ICU Vital Signs Last 24 Hrs  T(C): 36.6 (15 Jul 2018 03:10), Max: 37.5 (2018 22:10)  T(F): 97.9 (15 Jul 2018 03:10), Max: 99.5 (2018 22:10)  HR: 86 (15 Jul 2018 04:00) (76 - 107)  BP: 117/67 (15 Jul 2018 04:00) (101/48 - 139/73)  BP(mean): 83 (15 Jul 2018 04:00) (65 - 95)  RR: 21 (15 Jul 2018 04:00) (0 - 25)  SpO2: 98% (15 Jul 2018 04:) (94% - 99%)  I&O's Summary    2018 07:  -  2018 07:00  --------------------------------------------------------  IN: 525 mL / OUT: 0 mL / NET: 525 mL      -  15 Jul 2018 06:27  --------------------------------------------------------  IN: 570 mL / OUT: 0 mL / NET: 570 mL      I&O's Detail    2018 07:  -  2018 07:00  --------------------------------------------------------  IN:    sodium chloride 0.9%: 525 mL  Total IN: 525 mL    OUT:  Total OUT: 0 mL    Total NET: 525 mL      2018 07:  -  15 Jul 2018 06:27  --------------------------------------------------------  IN:    Packed Red Blood Cells: 500 mL    pantoprazole Infusion: 70 mL  Total IN: 570 mL    OUT:  Total OUT: 0 mL    Total NET: 570 mL    PHYSICAL EXAM:  General/Neuro: NAD AOX3  Lungs:      clear to auscultation, Normal expansion/effort.   Cardiovascular : S1, S2.  Regular rate and rhythm.  Peripheral edema   Cardiac Rhythm: Normal Sinus Rhythm  GI: Abdomen soft, Non-tender, Non-distended.    Extremities: Extremities warm, pink, well-perfused.  Derm: Good skin turgor, no skin breakdown.        LABS:  CAPILLARY BLOOD GLUCOSE  237 (2018 22:10)  269 (2018 21:23)  207 (2018 12:00)  223 (2018 08:23)                        7.2    6.37  )-----------( 192      ( 2018 18:14 )             19.5       07-15    139  |  101  |  10  ----------------------------<  167<H>  3.4<L>   |  25  |  0.7    Ca    7.8<L>      15 Jul 2018 05:08  Phos  3.1     07-15  Mg     2.0     07-15  TPro  5.4<L>  /  Alb  3.4<L>  /  TBili  0.4  /  DBili  x   /  AST  24  /  ALT  15  /  AlkPhos  45  07-14  CARDIAC MARKERS ( 15 Jul 2018 05:08 )  x     / <0.01 ng/mL / x     / x     / x

## 2018-07-15 NOTE — CONSULT NOTE ADULT - SUBJECTIVE AND OBJECTIVE BOX
JUVENTINO PERALTA [id number]  53y Male    HPI:  54yo male with brbpr since wed, cta on admission showed no extravasation. hd stable. called now as the pt has worsening bleeds with resultant drop in hg 14>7, in the process on transfusing 2 u prbc now. hd stable for repeat cta , ir to f/u vs gi   He denies abdominal pain, nausea, vomiting or fevers. No prior history of GI bleeding. Pt never had EGD and colonoscopy.  He denies any history of diverticulitis in the past. Patient denies any use of NSAIDS and blood thinners such as aspirin.       PAST MEDICAL & SURGICAL HISTORY:  Diabetes mellitus, type 2  Hypertension  High blood cholesterol  S/P appendectomy        MEDICATIONS  (STANDING):  dextrose 5%. 1000 milliLiter(s) (50 mL/Hr) IV Continuous <Continuous>  dextrose 50% Injectable 12.5 Gram(s) IV Push once  dextrose 50% Injectable 25 Gram(s) IV Push once  dextrose 50% Injectable 25 Gram(s) IV Push once  docusate sodium 100 milliGRAM(s) Oral two times a day  ferrous    sulfate 325 milliGRAM(s) Oral daily  insulin glargine Injectable (LANTUS) 15 Unit(s) SubCutaneous at bedtime  multivitamin 1 Tablet(s) Oral daily  pantoprazole Infusion 8 mG/Hr (10 mL/Hr) IV Continuous <Continuous>    MEDICATIONS  (PRN):  dextrose 40% Gel 15 Gram(s) Oral once PRN Blood Glucose LESS THAN 70 milliGRAM(s)/deciliter  glucagon  Injectable 1 milliGRAM(s) IntraMuscular once PRN Glucose LESS THAN 70 milligrams/deciliter      Allergies    No Known Allergies    Intolerances        REVIEW OF SYSTEMS    [ ] A ten-point review of systems was otherwise negative except as noted.      Vital Signs Last 24 Hrs  T(C): 37.3 (14 Jul 2018 23:31), Max: 37.5 (14 Jul 2018 22:10)  T(F): 99.1 (14 Jul 2018 23:31), Max: 99.5 (14 Jul 2018 22:10)  HR: 102 (14 Jul 2018 23:31) (83 - 107)  BP: 138/78 (14 Jul 2018 23:31) (108/56 - 139/73)  BP(mean): 95 (14 Jul 2018 23:31) (87 - 95)  RR: 22 (14 Jul 2018 23:31) (17 - 22)  SpO2: 96% (14 Jul 2018 23:31) (96% - 98%)    PHYSICAL EXAM:  GENERAL: NAD, well-appearing  CHEST/LUNG: Clear to auscultation bilaterally  HEART: Regular rate and rhythm  ABDOMEN: Soft, Nontender, Nondistended;   EXTREMITIES:  No clubbing, cyanosis, or edema      LABS:  Labs:  CAPILLARY BLOOD GLUCOSE  237 (14 Jul 2018 22:10)  269 (14 Jul 2018 21:23)  207 (14 Jul 2018 12:00)  223 (14 Jul 2018 08:23)                              7.2    6.37  )-----------( 192      ( 14 Jul 2018 18:14 )             19.5         07-14    139  |  100  |  7<L>  ----------------------------<  194<H>  3.8   |  28  |  0.7      Calcium, Total Serum: 8.1 mg/dL (07-14-18 @ 08:08)      LFTs:             5.4  | 0.4  | 24       ------------------[45      ( 14 Jul 2018 08:08 )  3.4  | x    | 15          Lipase:x      Amylase:x             Coags:                RADIOLOGY & ADDITIONAL STUDIES:    < from: CT Angio Abdomen and Pelvis w/ IV Cont (07.12.18 @ 01:05) >  IMPRESSION:     No CT evidence for intraluminal contrast extravasation to localize a site   of active GI bleed..    Very mild, uncomplicated, diverticulitis of the sigmoid colon.      < end of copied text >

## 2018-07-15 NOTE — CONSULT NOTE ADULT - ASSESSMENT
LGI  BLEED NO EVIDENCE OF DICERTICULITIS S/P CT ANGIO S/P 2 UNIT OF PRBC    - REPEAT CBC STAT IF LESS THAN 8 GIVE 1 UNIT PRBC, SERIAL CBC  - GI/ SX F/UP   - IF BLEED AGAIN CT ANGIO  - REPLETE LYTES  - NPO  - DVT PROPHYLAXIS

## 2018-07-15 NOTE — PROGRESS NOTE ADULT - ASSESSMENT
53 M GI bleed 2/2 likely diverticulosis.    Neuro:  awake alert no pain    Resp:  Room air    CVS:  # Anemia is from acute blood loss and dilutional;   s/p 2pRBC. Repeat Hb  Repeat CTA vs C-scope? recall GI  transfuse PRN  fe 325mg po q24, colace 100 q12, MVI q24  Protonix gtt. Consider dc as lower Gi bleed  2 18 guage current  Type and screen active    # HTN  on valsartan - ok to use    # DM T2 no complicated  lant 15 and lisp 5/meal  back to usual home meds on d/c  recent A1c 6.9    # Morbid Obesity    #hypokalmia  replete K+ and repeat

## 2018-07-15 NOTE — CONSULT NOTE ADULT - ASSESSMENT
52 yo with lgib    - transfuse prn  - f/u post transfusion hg  - for repeat cta today  - ir/gi to control bleed  - no role for surgical intervention at this time   - d/w dr Clemens

## 2018-07-15 NOTE — CONSULT NOTE ADULT - SUBJECTIVE AND OBJECTIVE BOX
Patient is a 53y old  Male who presents with a chief complaint of rectal bleed (13 Jul 2018 21:50)      HPI:  54yo male presents to the ER due to episodes of rectal bleeding  including episodes in our ER. Only associated symptom is a gurgling sensation in his stomach prior to each bowel movement. He denies abdominal pain, nausea, vomiting or fevers. No modifying factors except less bowel movements after ER intervention including iv antibiotics. He denies any history of diverticulitis but both his mother and brother have had it. Patient denies any use of blood thinners such as aspirin. (12 Jul 2018 04:31), ADMITTED TO [facility], HB 14 ON ADMISSION, HB 7.2 S/P 2 UNIT PRBC, I EPISODE OB BLEED last night, PROTONIX/ NPO      PAST MEDICAL & SURGICAL HISTORY:  Diabetes mellitus, type 2  Hypertension  High blood cholesterol  S/P appendectomy      SOCIAL HX:   Smoking  denies    FAMILY HISTORY:      REVIEW OF SYSTEMS hpi      Allergies    No Known Allergies    Intolerances        dextrose 40% Gel 15 Gram(s) Oral once PRN  dextrose 5%. 1000 milliLiter(s) IV Continuous <Continuous>  dextrose 50% Injectable 12.5 Gram(s) IV Push once  dextrose 50% Injectable 25 Gram(s) IV Push once  dextrose 50% Injectable 25 Gram(s) IV Push once  docusate sodium 100 milliGRAM(s) Oral two times a day  ferrous    sulfate 325 milliGRAM(s) Oral daily  glucagon  Injectable 1 milliGRAM(s) IntraMuscular once PRN  insulin glargine Injectable (LANTUS) 15 Unit(s) SubCutaneous at bedtime  multivitamin 1 Tablet(s) Oral daily  pantoprazole Infusion 8 mG/Hr IV Continuous <Continuous>  potassium chloride  20 mEq/100 mL IVPB 20 milliEquivalent(s) IV Intermittent every 2 hours  : Home Meds:      PHYSICAL EXAM    ICU Vital Signs Last 24 Hrs  T(C): 36.6 (15 Jul 2018 06:00), Max: 37.5 (14 Jul 2018 22:10)  T(F): 97.9 (15 Jul 2018 06:00), Max: 99.5 (14 Jul 2018 22:10)  HR: 74 (15 Jul 2018 06:00) (74 - 107)  BP: 98/49 (15 Jul 2018 06:00) (98/49 - 139/73)  BP(mean): 65 (15 Jul 2018 06:00) (65 - 95)  ABP: --  ABP(mean): --  RR: 0 (15 Jul 2018 06:00) (0 - 25)  SpO2: 97% (15 Jul 2018 06:00) (94% - 99%)      General:  HEENT:  JAYJAY              Lymph Nodes: No cervical LN   Lungs: Bilateral BS  Cardiovascular: Regular  Abdomen: Soft, Positive BS  Extremities: No clubbing  Skin: Warm  Neurological: Non focal       07-14-18 @ 07:01  -  07-15-18 @ 07:00  --------------------------------------------------------  IN:    Packed Red Blood Cells: 500 mL    pantoprazole Infusion: 90 mL  Total IN: 590 mL    OUT:  Total OUT: 0 mL    Total NET: 590 mL          LABS:                          7.6    6.74  )-----------( 193      ( 15 Jul 2018 05:08 )             21.7                                               07-15    139  |  101  |  10  ----------------------------<  167<H>  3.4<L>   |  25  |  0.7    Ca    7.8<L>      15 Jul 2018 05:08  Phos  3.1     07-15  Mg     2.0     07-15    TPro  5.4<L>  /  Alb  3.4<L>  /  TBili  0.4  /  DBili  x   /  AST  24  /  ALT  15  /  AlkPhos  45  07-14                                                 CARDIAC MARKERS ( 15 Jul 2018 05:08 )  x     / <0.01 ng/mL / x     / x     / x                                                LIVER FUNCTIONS - ( 14 Jul 2018 08:08 )  Alb: 3.4 g/dL / Pro: 5.4 g/dL / ALK PHOS: 45 U/L / ALT: 15 U/L / AST: 24 U/L / GGT: x                                                                                                                                       X-Rays                                                                                     ECHO    MEDICATIONS  (STANDING):  dextrose 5%. 1000 milliLiter(s) (50 mL/Hr) IV Continuous <Continuous>  dextrose 50% Injectable 12.5 Gram(s) IV Push once  dextrose 50% Injectable 25 Gram(s) IV Push once  dextrose 50% Injectable 25 Gram(s) IV Push once  docusate sodium 100 milliGRAM(s) Oral two times a day  ferrous    sulfate 325 milliGRAM(s) Oral daily  insulin glargine Injectable (LANTUS) 15 Unit(s) SubCutaneous at bedtime  multivitamin 1 Tablet(s) Oral daily  pantoprazole Infusion 8 mG/Hr (10 mL/Hr) IV Continuous <Continuous>  potassium chloride  20 mEq/100 mL IVPB 20 milliEquivalent(s) IV Intermittent every 2 hours    MEDICATIONS  (PRN):  dextrose 40% Gel 15 Gram(s) Oral once PRN Blood Glucose LESS THAN 70 milliGRAM(s)/deciliter  glucagon  Injectable 1 milliGRAM(s) IntraMuscular once PRN Glucose LESS THAN 70 milligrams/deciliter

## 2018-07-15 NOTE — PROGRESS NOTE ADULT - ASSESSMENT
# NO DIVERTICULITIS - stop ABX    # Anemia is from acute blood loss; hgb fell again  fe 325mg po q24, colace 100 q12, MVI q24 til hgb > 10  check cbc 2x/day  hgb cont to fall, will ask GI to re-eval  prev CT angio was neg for active bleeding site; trying CT angio again to see if IR can help  pt will likely need bowel prep and C-scope once stable  NPO; NS 75/hr  if NPO is going to stay awhile, would contact nutrtion - dr cullen - for PPN    # LGIB - likely diverticulosis  plan as above  PPI drip is fine for now    # DM T2 no complicated  lant 15 - might need to inc as FS not controlled (but also not eating)  hold lispro until eating again  back to usual home meds on d/c  recent A1c 6.9    # Morbid Obesity  lose wt    # HTN  hold valsartan until BP stable

## 2018-07-15 NOTE — PROGRESS NOTE ADULT - SUBJECTIVE AND OBJECTIVE BOX
JUVENTINO PERALTA  53y  Male  ***My note supercedes ALL resident notes that I sign.  My corrections for their notes are in my note.***    I can be reached directly on [employer] [id number]. My office number is [phone]. My personal cell number is [phone].    INTERVAL EVENTS: Pt had bleeding after trying to adv diet. No pain. Pt s/p tx. Doing fine. No complaints.    T(F): 98.3 (07-15-18 @ 08:00), Max: 99.5 (07-14-18 @ 22:10)  HR: 90 (07-15-18 @ 10:00) (74 - 107)  BP: 115/73 (07-15-18 @ 10:00) (98/49 - 139/73)  RR: 22 (07-15-18 @ 10:00) (0 - 25)  SpO2: 96% (07-15-18 @ 10:00) (94% - 99%)    PHYSICAL EXAM:  EYES: No scleral icterus   neck no nodes  LUNG: Clear to auscultation bilaterally;  HEART: RRR; No murmurs  ABDOMEN: Soft, +BS, NO  Abdominal Tenderness, No guarding, No masses  ext no edema, no c/c  neuro - cn intact; motor/sens intact, gait nl    CAPILLARY BLOOD GLUCOSE  237 (07-14-18 @ 22:10)  269 (07-14-18 @ 21:23)  207 (07-14-18 @ 12:00)  223 (07-14-18 @ 08:23)  204 (07-13-18 @ 21:46)  175 (07-13-18 @ 16:30)  213 (07-13-18 @ 12:18)  226 (07-13-18 @ 07:54)    LABS:                        7.6     (    84.8   6.74  )-----------( ---------      193      ( 15 Jul 2018 05:08 )             21.7    (    12.9     Hemoglobin: 7.6 g/dL (07-15 @ 05:08)  Hemoglobin: 7.2 g/dL (07-14 @ 18:14)  Hemoglobin: 8.1 g/dL (07-14 @ 08:08)  Hemoglobin: 8.2 g/dL (07-13 @ 21:15)  Hemoglobin: 9.1 g/dL (07-13 @ 07:38)  Hemoglobin: 12.4 g/dL (07-12 @ 06:49)  Hemoglobin: 11.1 g/dL (07-12 @ 04:50)  Hemoglobin: 13.3 g/dL (07-12 @ 02:37)  Hemoglobin: 14.9 g/dL (07-11 @ 23:06)    139   (   101   (   167      07-15-18 @ 05:08  ----------------------               3.4   (   25   (   10                             -----                        0.7  Ca  7.8   Mg  2.0    P   3.1     CARDIAC MARKERS ( 15 Jul 2018 05:08 )  x     / <0.01 ng/mL / x     / x     / x        RADIOLOGY & ADDITIONAL TESTS:      MEDICATIONS:    docusate sodium 100 milliGRAM(s) Oral two times a day  ferrous    sulfate 325 milliGRAM(s) Oral daily  glucagon  Injectable 1 milliGRAM(s) IntraMuscular once PRN  insulin glargine Injectable (LANTUS) 15 Unit(s) SubCutaneous at bedtime  multivitamin 1 Tablet(s) Oral daily  pantoprazole Infusion 8 mG/Hr IV Continuous <Continuous>  potassium chloride  20 mEq/100 mL IVPB 20 milliEquivalent(s) IV Intermittent every 2 hours  sodium chloride 0.9%. 1000 milliLiter(s) IV Continuous <Continuous>

## 2018-07-15 NOTE — PROGRESS NOTE ADULT - SUBJECTIVE AND OBJECTIVE BOX
GI HPI Today:  Patient is a 53y old  Male who presents with a chief complaint of rectal bleed (13 Jul 2018 21:50)  52yo male with history of HTN, DLD, DM presented to  ER on 11 july at around 11:30 pm due to episodes of rectal bleeding that started on same day since 1900. He had further episodes of BRBPR in our ER. Only associated symptom is a gurgling sensation in his stomach prior to each bowel movement. He denies abdominal pain, nausea, vomiting or fevers. No prior history of GI bleeding. Pt never had EGD and colonoscopy.  He denies any history of diverticulitis in the past. Patient denies any use of NSAIDS and blood thinners such as aspirin.   He was initially admitted to [facility] where he continued to have BRBPR underwent CT Angio which did not show any intraluminal bleeding but showed very mild diverticulitis in sigmoid colon. Pt was started on IV antibiotics.   Then his GI bleeding was resolved and pt diet was advanced to solid food. Last night pt had 2-3 episodes of BRBPR and c/o weakness, fatigue. Drop in Hb from 8 to 7.  Transfused 2 units prbc and upgraded to unit. Pt awaiting for CT angio of abdomen.         PAST MEDICAL & SURGICAL HISTORY  Diabetes mellitus, type 2  Hypertension  High blood cholesterol  S/P appendectomy      ALLERGIES:  No Known Allergies      MEDICATIONS:  MEDICATIONS  (STANDING):  dextrose 5%. 1000 milliLiter(s) (50 mL/Hr) IV Continuous <Continuous>  dextrose 50% Injectable 12.5 Gram(s) IV Push once  dextrose 50% Injectable 25 Gram(s) IV Push once  dextrose 50% Injectable 25 Gram(s) IV Push once  docusate sodium 100 milliGRAM(s) Oral two times a day  ferrous    sulfate 325 milliGRAM(s) Oral daily  insulin glargine Injectable (LANTUS) 15 Unit(s) SubCutaneous at bedtime  multivitamin 1 Tablet(s) Oral daily  pantoprazole Infusion 8 mG/Hr (10 mL/Hr) IV Continuous <Continuous>  potassium chloride  20 mEq/100 mL IVPB 20 milliEquivalent(s) IV Intermittent every 2 hours    MEDICATIONS  (PRN):  dextrose 40% Gel 15 Gram(s) Oral once PRN Blood Glucose LESS THAN 70 milliGRAM(s)/deciliter  glucagon  Injectable 1 milliGRAM(s) IntraMuscular once PRN Glucose LESS THAN 70 milligrams/deciliter      REVIEW OF SYSTEMS:    CONSTITUTIONAL: No weakness, fatigue, malaise, fevers or chills, no weight change, appetite change  Throat: No Dysphagia, No Odynophagia  RESPIRATORY: No SOB  CARDIOVASCULAR: No chest pain   Muscloskeletal: no joints pain  NEUROLOGICAL: No syncope or diziness  SKIN: No pruritis  Heme/Lymph: no LN enlargement         VITALS:   T(F): 98.3 (07-15 @ 08:00), Max: 99.5 (07-14 @ 22:10)  HR: 82 (07-15 @ 08:00) (74 - 107)  BP: 125/73 (07-15 @ 08:00) (98/49 - 139/73)  BP(mean): 65 (07-15 @ 06:00) (65 - 95)  RR: 15 (07-15 @ 08:00) (0 - 25)  SpO2: 97% (07-15 @ 08:00) (94% - 100%)        PHYSICAL EXAM:  EYES: No scleral icterus   LUNG: Clear to auscultation bilaterally; No rales, rhonchi, wheezing, or rubs  HEART: RRR; No murmurs  ABDOMEN: Soft, +BS, No Abdominal Tenderness, No guarding, No Sánchez Sign   Rectal Exam:      Blood Work :                        7.6    6.74  )-----------( 193      ( 15 Jul 2018 05:08 )             21.7     PT/INR - ( 11 Jul 2018 23:06 )  INR: 1.09          PTT - ( 11 Jul 2018 23:06 )  PTT:27.4   07-15    139  |  101  |  10  ----------------------------<  167<H>  3.4<L>   |  25  |  0.7    Ca    7.8<L>      15 Jul 2018 05:08  Phos  3.1     07-15  Mg     2.0     07-15      CBC -  ( 15 Jul 2018 05:08 )  Hemoglobin : 7.6    CBC -  ( 14 Jul 2018 18:14 )  Hemoglobin : 7.2    CBC -  ( 14 Jul 2018 08:08 )  Hemoglobin : 8.1    CBC -  ( 13 Jul 2018 21:15 )  Hemoglobin : 8.2    CBC -  ( 13 Jul 2018 07:38 )  Hemoglobin : 9.1    CBC -  ( 12 Jul 2018 06:49 )  Hemoglobin : 12.4    CBC -  ( 12 Jul 2018 04:50 )  Hemoglobin : 11.1    CBC -  ( 12 Jul 2018 02:37 )  Hemoglobin : 13.3    CBC -  ( 11 Jul 2018 23:06 )  Hemoglobin : 14.9      LIVER FUNCTIONS - ( 14 Jul 2018 08:08 )  Alb: 3.4 [3.5 - 5.2] / Pro: 5.4 [6.0 - 8.0] / ALK PHOS: 45 [30 - 115] / ALT: 15 [0 - 41] / AST: 24 [0 - 41] / GGT: x     LIVER FUNCTIONS - ( 11 Jul 2018 23:06 )  Alb: 4.3 [3.5 - 5.2] / Pro: 6.9 [6.0 - 8.0] / ALK PHOS: 56 [30 - 115] / ALT: 18 [0 - 41] / AST: 18 [0 - 41] / GGT: x         RADIOLOGY: GI HPI Today:  Patient is a 53y old  Male who presents with a chief complaint of rectal bleed (13 Jul 2018 21:50)  52yo male with history of HTN, DLD, DM presented to  ER on 11 july at around 11:30 pm due to episodes of rectal bleeding that started on same day since 1900. He had further episodes of BRBPR in our ER. Only associated symptom is a gurgling sensation in his stomach prior to each bowel movement. He denies abdominal pain, nausea, vomiting or fevers. No prior history of GI bleeding. Pt never had EGD and colonoscopy.  He denies any history of diverticulitis in the past. Patient denies any use of NSAIDS and blood thinners such as aspirin.   He was initially admitted to [facility] where he continued to have BRBPR underwent CT Angio which did not show any intraluminal bleeding but showed very mild diverticulitis in sigmoid colon. Pt was started on IV antibiotics.   Then his GI bleeding was resolved and pt diet was advanced to solid food. Last night pt had 2-3 episodes of BRBPR and c/o weakness, fatigue. Drop in Hb from 8 to 7.  Transfused 2 units prbc and upgraded to unit. Pt awaiting for CT angio of abdomen.         PAST MEDICAL & SURGICAL HISTORY  Diabetes mellitus, type 2  Hypertension  High blood cholesterol  S/P appendectomy      ALLERGIES:  No Known Allergies      MEDICATIONS:  MEDICATIONS  (STANDING):  dextrose 5%. 1000 milliLiter(s) (50 mL/Hr) IV Continuous <Continuous>  dextrose 50% Injectable 12.5 Gram(s) IV Push once  dextrose 50% Injectable 25 Gram(s) IV Push once  dextrose 50% Injectable 25 Gram(s) IV Push once  docusate sodium 100 milliGRAM(s) Oral two times a day  ferrous    sulfate 325 milliGRAM(s) Oral daily  insulin glargine Injectable (LANTUS) 15 Unit(s) SubCutaneous at bedtime  multivitamin 1 Tablet(s) Oral daily  pantoprazole Infusion 8 mG/Hr (10 mL/Hr) IV Continuous <Continuous>  potassium chloride  20 mEq/100 mL IVPB 20 milliEquivalent(s) IV Intermittent every 2 hours    MEDICATIONS  (PRN):  dextrose 40% Gel 15 Gram(s) Oral once PRN Blood Glucose LESS THAN 70 milliGRAM(s)/deciliter  glucagon  Injectable 1 milliGRAM(s) IntraMuscular once PRN Glucose LESS THAN 70 milligrams/deciliter      REVIEW OF SYSTEMS:    CONSTITUTIONAL: No weakness, fatigue, malaise, fevers or chills, no weight change, appetite change  Throat: No Dysphagia, No Odynophagia  RESPIRATORY: No SOB  CARDIOVASCULAR: No chest pain   Muscloskeletal: no joints pain  NEUROLOGICAL: No syncope or diziness  SKIN: No pruritis  Heme/Lymph: no LN enlargement         VITALS:   T(F): 98.3 (07-15 @ 08:00), Max: 99.5 (07-14 @ 22:10)  HR: 82 (07-15 @ 08:00) (74 - 107)  BP: 125/73 (07-15 @ 08:00) (98/49 - 139/73)  BP(mean): 65 (07-15 @ 06:00) (65 - 95)  RR: 15 (07-15 @ 08:00) (0 - 25)  SpO2: 97% (07-15 @ 08:00) (94% - 100%)        PHYSICAL EXAM:  EYES: No scleral icterus   LUNG: Clear to auscultation bilaterally; No rales, rhonchi, wheezing, or rubs  HEART: RRR; No murmurs  ABDOMEN: Soft, +BS, No Abdominal Tenderness, No guarding, No Sánchez Sign   Rectal Exam: not done today     Blood Work :                        7.6    6.74  )-----------( 193      ( 15 Jul 2018 05:08 )             21.7     PT/INR - ( 11 Jul 2018 23:06 )  INR: 1.09          PTT - ( 11 Jul 2018 23:06 )  PTT:27.4   07-15    139  |  101  |  10  ----------------------------<  167<H>  3.4<L>   |  25  |  0.7    Ca    7.8<L>      15 Jul 2018 05:08  Phos  3.1     07-15  Mg     2.0     07-15      CBC -  ( 15 Jul 2018 05:08 )  Hemoglobin : 7.6    CBC -  ( 14 Jul 2018 18:14 )  Hemoglobin : 7.2    CBC -  ( 14 Jul 2018 08:08 )  Hemoglobin : 8.1    CBC -  ( 13 Jul 2018 21:15 )  Hemoglobin : 8.2    CBC -  ( 13 Jul 2018 07:38 )  Hemoglobin : 9.1    CBC -  ( 12 Jul 2018 06:49 )  Hemoglobin : 12.4    CBC -  ( 12 Jul 2018 04:50 )  Hemoglobin : 11.1    CBC -  ( 12 Jul 2018 02:37 )  Hemoglobin : 13.3    CBC -  ( 11 Jul 2018 23:06 )  Hemoglobin : 14.9      LIVER FUNCTIONS - ( 14 Jul 2018 08:08 )  Alb: 3.4 [3.5 - 5.2] / Pro: 5.4 [6.0 - 8.0] / ALK PHOS: 45 [30 - 115] / ALT: 15 [0 - 41] / AST: 24 [0 - 41] / GGT: x     LIVER FUNCTIONS - ( 11 Jul 2018 23:06 )  Alb: 4.3 [3.5 - 5.2] / Pro: 6.9 [6.0 - 8.0] / ALK PHOS: 56 [30 - 115] / ALT: 18 [0 - 41] / AST: 18 [0 - 41] / GGT: x         RADIOLOGY:

## 2018-07-15 NOTE — PROGRESS NOTE ADULT - ASSESSMENT
52yo male with history of HTN, DLD, DM presented to [facility] ER on 11 july at around 11:30 pm due to episodes of rectal bleeding that started on same day since 7:00 pm. He had further 10-12 episodes of BRBPR associated with a drop in Hb from 14.9 to 12.7.  His vital signs were stable. Pt underwent CT Angio of abdomen which did not show any Intraluminal GI bleeding but revealed a very mild sigmoid diverticulitis.  He was transferred from [facility] to [place] site in anticipation that if he continues to bleed he might need an IR intervention.  Then his Lower GI bleed was resolved and rec to get colonoscopy in 6 weeks after diverticulitis is resolved.  Later his CT abdomen report was amended that there is no evidence of diverticulitis and clinically pt denies abd pain and fever, No leukocytosis. IV ABX were stopped.     Last night pt had 2-3 episodes of BRBPR and c/o weakness, fatigue. Drop in Hb from 8 to 7.  Transfused 2 units prbc and upgraded to unit. Pt awaiting for CT angio of abdomen.   current Vital signs are stable   Lower GI Bleed likely sec to diverticulosis-  Keep NPO  repeat cbc, transfuse as needed.  will discuss with attending for possible colonoscopy tmrw. 54yo male with history of HTN, DLD, DM presented to [facility] ER on 11 july at around 11:30 pm due to episodes of rectal bleeding that started on same day since 7:00 pm. He had further 10-12 episodes of BRBPR associated with a drop in Hb from 14.9 to 12.7.  His vital signs were stable. Pt underwent CT Angio of abdomen which did not show any Intraluminal GI bleeding but revealed a very mild sigmoid diverticulitis.  He was transferred from [facility] to [place] site in anticipation that if he continues to bleed he might need an IR intervention.  Then his Lower GI bleed was resolved and rec to get colonoscopy in 6 weeks after diverticulitis is resolved.  Later his CT abdomen report was amended that there is no evidence of diverticulitis and clinically pt denies abd pain and fever, No leukocytosis. IV ABX were stopped.     Last night pt had 2-3 episodes of BRBPR and c/o weakness, fatigue. Drop in Hb from 8 to 7.  Transfused 2 units prbc and upgraded to unit. Pt awaiting for CT angio of abdomen.   current Vital signs are stable   Lower GI Bleed likely sec to diverticulosis-  Last Bloody BM was last night at around 10:00 pm   Keep NPO  repeat cbc, transfuse as needed.  will discuss with attending for possible colonoscopy tmrw. 54yo male with history of HTN, DLD, DM presented to [facility] ER on 11 july at around 11:30 pm due to episodes of rectal bleeding that started on same day since 7:00 pm. He had further 10-12 episodes of BRBPR associated with a drop in Hb from 14.9 to 12.7.  His vital signs were stable. Pt underwent CT Angio of abdomen which did not show any Intraluminal GI bleeding but revealed a very mild sigmoid diverticulitis.  He was transferred from [facility] to [place] site in anticipation that if he continues to bleed he might need an IR intervention.  Then his Lower GI bleed was resolved and rec to get colonoscopy in 6 weeks after diverticulitis is resolved.  Later his CT abdomen report was amended that there is no evidence of diverticulitis and clinically pt denies abd pain and fever, No leukocytosis. IV ABX were stopped.     Last night pt had 2-3 episodes of BRBPR and c/o weakness, fatigue. Drop in Hb from 8 to 7.  Transfused 2 units prbc and upgraded to unit.   current Vital signs are stable, and bleeding stopped.   Lower GI Bleed likely sec to diverticulosis-  Last Bloody BM was last night at around 10:00 pm   Clear liquids  repeat cbc, transfuse as needed.  Obtain a CT scan of abdomen and pelvis with IV contrast to r/o any diverticulitis if any  Colonoscopy this coming week, based on CT result.  will f/up

## 2018-07-16 LAB
ALBUMIN SERPL ELPH-MCNC: 2.9 G/DL — LOW (ref 3.5–5.2)
ALP SERPL-CCNC: 36 U/L — SIGNIFICANT CHANGE UP (ref 30–115)
ALT FLD-CCNC: 19 U/L — SIGNIFICANT CHANGE UP (ref 0–41)
ANION GAP SERPL CALC-SCNC: 16 MMOL/L — HIGH (ref 7–14)
AST SERPL-CCNC: 34 U/L — SIGNIFICANT CHANGE UP (ref 0–41)
BILIRUB SERPL-MCNC: 0.5 MG/DL — SIGNIFICANT CHANGE UP (ref 0.2–1.2)
BLD GP AB SCN SERPL QL: SIGNIFICANT CHANGE UP
BUN SERPL-MCNC: 10 MG/DL — SIGNIFICANT CHANGE UP (ref 10–20)
CALCIUM SERPL-MCNC: 7.6 MG/DL — LOW (ref 8.5–10.1)
CHLORIDE SERPL-SCNC: 100 MMOL/L — SIGNIFICANT CHANGE UP (ref 98–110)
CO2 SERPL-SCNC: 22 MMOL/L — SIGNIFICANT CHANGE UP (ref 17–32)
CREAT SERPL-MCNC: 0.8 MG/DL — SIGNIFICANT CHANGE UP (ref 0.7–1.5)
GLUCOSE SERPL-MCNC: 195 MG/DL — HIGH (ref 70–99)
HCT VFR BLD CALC: 20.9 % — LOW (ref 42–52)
HCT VFR BLD CALC: 21.1 % — LOW (ref 42–52)
HCT VFR BLD CALC: 23.4 % — LOW (ref 42–52)
HGB BLD-MCNC: 7.2 G/DL — CRITICAL LOW (ref 14–18)
HGB BLD-MCNC: 7.3 G/DL — CRITICAL LOW (ref 14–18)
HGB BLD-MCNC: 8.1 G/DL — LOW (ref 14–18)
MCHC RBC-ENTMCNC: 29.1 PG — SIGNIFICANT CHANGE UP (ref 27–31)
MCHC RBC-ENTMCNC: 29.9 PG — SIGNIFICANT CHANGE UP (ref 27–31)
MCHC RBC-ENTMCNC: 30 PG — SIGNIFICANT CHANGE UP (ref 27–31)
MCHC RBC-ENTMCNC: 34.4 G/DL — SIGNIFICANT CHANGE UP (ref 32–37)
MCHC RBC-ENTMCNC: 34.6 G/DL — SIGNIFICANT CHANGE UP (ref 32–37)
MCHC RBC-ENTMCNC: 34.6 G/DL — SIGNIFICANT CHANGE UP (ref 32–37)
MCV RBC AUTO: 84.2 FL — SIGNIFICANT CHANGE UP (ref 80–94)
MCV RBC AUTO: 86.7 FL — SIGNIFICANT CHANGE UP (ref 80–94)
MCV RBC AUTO: 86.8 FL — SIGNIFICANT CHANGE UP (ref 80–94)
NRBC # BLD: 0 /100 WBCS — SIGNIFICANT CHANGE UP (ref 0–0)
PLATELET # BLD AUTO: 202 K/UL — SIGNIFICANT CHANGE UP (ref 130–400)
PLATELET # BLD AUTO: 213 K/UL — SIGNIFICANT CHANGE UP (ref 130–400)
PLATELET # BLD AUTO: 229 K/UL — SIGNIFICANT CHANGE UP (ref 130–400)
POTASSIUM SERPL-MCNC: 4.2 MMOL/L — SIGNIFICANT CHANGE UP (ref 3.5–5)
POTASSIUM SERPL-SCNC: 4.2 MMOL/L — SIGNIFICANT CHANGE UP (ref 3.5–5)
PROT SERPL-MCNC: 4.8 G/DL — LOW (ref 6–8)
RBC # BLD: 2.41 M/UL — LOW (ref 4.7–6.1)
RBC # BLD: 2.43 M/UL — LOW (ref 4.7–6.1)
RBC # BLD: 2.78 M/UL — LOW (ref 4.7–6.1)
RBC # FLD: 13.1 % — SIGNIFICANT CHANGE UP (ref 11.5–14.5)
RBC # FLD: 13.9 % — SIGNIFICANT CHANGE UP (ref 11.5–14.5)
RBC # FLD: 14.6 % — HIGH (ref 11.5–14.5)
SODIUM SERPL-SCNC: 138 MMOL/L — SIGNIFICANT CHANGE UP (ref 135–146)
TYPE + AB SCN PNL BLD: SIGNIFICANT CHANGE UP
WBC # BLD: 6.77 K/UL — SIGNIFICANT CHANGE UP (ref 4.8–10.8)
WBC # BLD: 7.27 K/UL — SIGNIFICANT CHANGE UP (ref 4.8–10.8)
WBC # BLD: 9.82 K/UL — SIGNIFICANT CHANGE UP (ref 4.8–10.8)
WBC # FLD AUTO: 6.77 K/UL — SIGNIFICANT CHANGE UP (ref 4.8–10.8)
WBC # FLD AUTO: 7.27 K/UL — SIGNIFICANT CHANGE UP (ref 4.8–10.8)
WBC # FLD AUTO: 9.82 K/UL — SIGNIFICANT CHANGE UP (ref 4.8–10.8)

## 2018-07-16 RX ORDER — IOHEXOL 300 MG/ML
30 INJECTION, SOLUTION INTRAVENOUS ONCE
Qty: 0 | Refills: 0 | Status: COMPLETED | OUTPATIENT
Start: 2018-07-16 | End: 2018-07-16

## 2018-07-16 RX ORDER — MULTIVIT WITH MIN/MFOLATE/K2 340-15/3 G
300 POWDER (GRAM) ORAL ONCE
Qty: 0 | Refills: 0 | Status: COMPLETED | OUTPATIENT
Start: 2018-07-16 | End: 2018-07-16

## 2018-07-16 RX ADMIN — Medication 1 ENEMA: at 13:08

## 2018-07-16 RX ADMIN — IOHEXOL 30 MILLILITER(S): 300 INJECTION, SOLUTION INTRAVENOUS at 10:43

## 2018-07-16 RX ADMIN — Medication 300 MILLILITER(S): at 11:19

## 2018-07-16 RX ADMIN — Medication 1 ENEMA: at 13:09

## 2018-07-16 NOTE — PROGRESS NOTE ADULT - ASSESSMENT
# NO DIVERTICULITIS - stop ABX    # Anemia is from acute blood loss; hgb fell again, s/p 3 U RBC  d/w GI- d/c protonix, NPO for at least 24 hr, if active bleed-get CT angio and call IR  EGD today- no bleed  colonoscopy today- diverticulosis, no active bleed    stop fe 325mg po q24   colace 100 q12, MVI q24 til hgb > 10  check cbc 2x/day  prev CT angio was neg for active bleeding site  CT abd cancelled today       # DM T2 no complicated  lant 15 - might need to inc as FS not controlled (but also not eating)  hold lispro until eating again  back to usual home meds on d/c  recent A1c 6.9    # Morbid Obesity  lose wt    # HTN  hold valsartan until BP stable # NO DIVERTICULITIS - stop ABX    # Anemia is from acute blood loss; hgb fell again, s/p 3 U RBC  d/w GI- d/c protonix, NPO for at least 24 hr, if active bleed-get CT angio and call IR  EGD today- no bleed  colonoscopy today- diverticulosis, no active bleed    stop fe 325mg po q24   stop colace 100 q12   MVI q24 til hgb > 10  check cbc 2x/day  prev CT angio was neg for active bleeding site  CT abd cancelled today       # DM T2 no complicated  lant 15 - might need to inc as FS not controlled (but also not eating)  hold lispro until eating again  back to usual home meds on d/c  recent A1c 6.9    # Morbid Obesity  lose wt    # HTN  hold valsartan until BP stable

## 2018-07-16 NOTE — PROGRESS NOTE ADULT - SUBJECTIVE AND OBJECTIVE BOX
GENERAL SURGERY PROGRESS NOTE     JUVENTINO PERALTA  53y  Male  Hospital day :4d    OVERNIGHT EVENTS:    Patient feels lethargic, SOB, and lightheaded. He says he feel worse than when he first came in. Has had several bloody bm overnight.     T(F): 97.6 (07-16-18 @ 04:01), Max: 98.9 (07-15-18 @ 12:00)  HR: 87 (07-16-18 @ 06:00) (82 - 104)  BP: 125/71 (07-16-18 @ 06:00) (115/73 - 147/79)  ABP: --  ABP(mean): --  RR: 18 (07-16-18 @ 06:00) (14 - 24)  SpO2: 98% (07-16-18 @ 06:00) (96% - 100%)    DIET/FLUIDS: dextrose 5%. 1000 milliLiter(s) IV Continuous <Continuous>  ferrous    sulfate 325 milliGRAM(s) Oral daily  multivitamin 1 Tablet(s) Oral daily  sodium chloride 0.9%. 1000 milliLiter(s) IV Continuous <Continuous>    NG:                                                                                DRAINS:     BM:     EMESIS:     URINE:      GI proph:  pantoprazole Infusion 8 mG/Hr IV Continuous <Continuous>    AC/ proph:   ABx:     PHYSICAL EXAM:  GENERAL: pale and lethargic.   ABDOMEN: Soft, Nontender, Nondistended;         LABS  Labs:  CAPILLARY BLOOD GLUCOSE  138 (15 Jul 2018 22:00)  139 (15 Jul 2018 15:00)  170 (15 Jul 2018 11:55)                              7.2    9.82  )-----------( 213      ( 16 Jul 2018 05:17 )             20.9       Auto Neutrophil %: 57.9 % (07-15-18 @ 12:58)  Auto Immature Granulocyte %: 0.9 % (07-15-18 @ 12:58)    07-16    138  |  100  |  10  ----------------------------<  195<H>  4.2   |  22  |  0.8      Calcium, Total Serum: 7.6 mg/dL (07-16-18 @ 05:17)      LFTs:             4.8  | 0.5  | 34       ------------------[36      ( 16 Jul 2018 05:17 )  2.9  | x    | 19          Lipase:x      Amylase:x             Coags:    CARDIAC MARKERS ( 15 Jul 2018 12:58 )  x     / <0.01 ng/mL / x     / x     / x      CARDIAC MARKERS ( 15 Jul 2018 05:08 )  x     / <0.01 ng/mL / x     / x     / x                  RADIOLOGY & ADDITIONAL TESTS:      A/P

## 2018-07-16 NOTE — DIETITIAN INITIAL EVALUATION ADULT. - SOURCE
p/w bloody BM, s/p 3u PRBC, GI following, monitoring hgb, possible colonoscopy to determine bleeding. Pt feels lethargic, SOB< lightheaded. Currently pt is being transfused with his 4th PRBC. RN reported pt going for CT today./patient/other (specify)

## 2018-07-16 NOTE — CHART NOTE - NSCHARTNOTEFT_GEN_A_CORE
Patient mentioned having episodes of black stool in addition to the rectal bleed. so upper GI bleed should be ruled out. Patient explained the risk and benefit of endoscopy and he accepted to have upper endoscopy. Patient signed consent for both EGD and colonoscopy.

## 2018-07-16 NOTE — PROGRESS NOTE ADULT - SUBJECTIVE AND OBJECTIVE BOX
Over Night Events:  Remained hemodynamically stable, seen by GI, wants results of repeat CT abdo. Not done  Has had 4 BMs, bloody. No hypotension  Abx stopped, due to amendment in CT abdo report that there is no diverticulitis. No clinical sign / symptom of diverticulitis either    ROS:  See HPI    PHYSICAL EXAM    ICU Vital Signs Last 24 Hrs  T(C): 36.6 (16 Jul 2018 08:10), Max: 37.2 (15 Jul 2018 12:00)  T(F): 97.9 (16 Jul 2018 08:10), Max: 98.9 (15 Jul 2018 12:00)  HR: 86 (16 Jul 2018 08:10) (82 - 104)  BP: 140/65 (16 Jul 2018 08:10) (115/73 - 147/79)  BP(mean): 87 (16 Jul 2018 08:10) (87 - 97)  ABP: --  ABP(mean): --  RR: 18 (16 Jul 2018 08:10) (14 - 24)  SpO2: 97% (16 Jul 2018 08:10) (96% - 100%)      General: lying in bed, awake and alter  HEENT: JAYJAY             Lymph Nodes: No cervical LN   Lungs: Bilateral BS  Cardiovascular: Regular   Abdomen: Soft, Positive BS  Extremities: No clubbing   Skin: Warm  Neurological: Non focal       07-15-18 @ 07:01  -  07-16-18 @ 07:00  --------------------------------------------------------  IN:    IV PiggyBack: 200 mL    pantoprazole Infusion: 230 mL    PRBCs (Packed Red Blood Cells): 250 mL    sodium chloride 0.9%.: 1200 mL  Total IN: 1880 mL    OUT:    Voided: 850 mL  Total OUT: 850 mL    Total NET: 1030 mL      07-16-18 @ 07:01  -  07-16-18 @ 08:58  --------------------------------------------------------  IN:    pantoprazole Infusion: 20 mL    sodium chloride 0.9%.: 150 mL  Total IN: 170 mL    OUT:  Total OUT: 0 mL    Total NET: 170 mL          LABS:                          7.2    9.82  )-----------( 213      ( 16 Jul 2018 05:17 )             20.9                                               07-16    138  |  100  |  10  ----------------------------<  195<H>  4.2   |  22  |  0.8    Ca    7.6<L>      16 Jul 2018 05:17  Phos  3.1     07-15  Mg     2.0     07-15    TPro  4.8<L>  /  Alb  2.9<L>  /  TBili  0.5  /  DBili  x   /  AST  34  /  ALT  19  /  AlkPhos  36  07-16                                                 CARDIAC MARKERS ( 15 Jul 2018 12:58 )  x     / <0.01 ng/mL / x     / x     / x      CARDIAC MARKERS ( 15 Jul 2018 05:08 )  x     / <0.01 ng/mL / x     / x     / x                                                LIVER FUNCTIONS - ( 16 Jul 2018 05:17 )  Alb: 2.9 g/dL / Pro: 4.8 g/dL / ALK PHOS: 36 U/L / ALT: 19 U/L / AST: 34 U/L / GGT: x                                                                                                                                       MEDICATIONS  (STANDING):  dextrose 5%. 1000 milliLiter(s) (50 mL/Hr) IV Continuous <Continuous>  dextrose 50% Injectable 12.5 Gram(s) IV Push once  dextrose 50% Injectable 25 Gram(s) IV Push once  dextrose 50% Injectable 25 Gram(s) IV Push once  docusate sodium 100 milliGRAM(s) Oral two times a day  ferrous    sulfate 325 milliGRAM(s) Oral daily  insulin glargine Injectable (LANTUS) 15 Unit(s) SubCutaneous at bedtime  multivitamin 1 Tablet(s) Oral daily  pantoprazole Infusion 8 mG/Hr (10 mL/Hr) IV Continuous <Continuous>  sodium chloride 0.9%. 1000 milliLiter(s) (75 mL/Hr) IV Continuous <Continuous>    MEDICATIONS  (PRN):  dextrose 40% Gel 15 Gram(s) Oral once PRN Blood Glucose LESS THAN 70 milliGRAM(s)/deciliter  glucagon  Injectable 1 milliGRAM(s) IntraMuscular once PRN Glucose LESS THAN 70 milligrams/deciliter      Xrays:                                               no infiltrates                                      ECHO Over Night Events:  Remained hemodynamically stable, seen by GI, for repeat ct a/p  Has had 4 BMs, bloody. No hypotension  Abx stopped, due to amendment in CT abdo report that there is no diverticulitis. No clinical sign / symptom of diverticulitis either    ROS:  See HPI    PHYSICAL EXAM    ICU Vital Signs Last 24 Hrs  T(C): 36.6 (16 Jul 2018 08:10), Max: 37.2 (15 Jul 2018 12:00)  T(F): 97.9 (16 Jul 2018 08:10), Max: 98.9 (15 Jul 2018 12:00)  HR: 86 (16 Jul 2018 08:10) (82 - 104)  BP: 140/65 (16 Jul 2018 08:10) (115/73 - 147/79)  BP(mean): 87 (16 Jul 2018 08:10) (87 - 97)  ABP: --  ABP(mean): --  RR: 18 (16 Jul 2018 08:10) (14 - 24)  SpO2: 97% (16 Jul 2018 08:10) (96% - 100%)      General: lying in bed, awake and alter  HEENT: JAYJAY             Lymph Nodes: No cervical LN   Lungs: Bilateral BS  Cardiovascular: Regular   Abdomen: Soft, Positive BS  Extremities: No clubbing   Skin: Warm  Neurological: Non focal       07-15-18 @ 07:01  -  07-16-18 @ 07:00  --------------------------------------------------------  IN:    IV PiggyBack: 200 mL    pantoprazole Infusion: 230 mL    PRBCs (Packed Red Blood Cells): 250 mL    sodium chloride 0.9%.: 1200 mL  Total IN: 1880 mL    OUT:    Voided: 850 mL  Total OUT: 850 mL    Total NET: 1030 mL      07-16-18 @ 07:01  -  07-16-18 @ 08:58  --------------------------------------------------------  IN:    pantoprazole Infusion: 20 mL    sodium chloride 0.9%.: 150 mL  Total IN: 170 mL    OUT:  Total OUT: 0 mL    Total NET: 170 mL          LABS:                          7.2    9.82  )-----------( 213      ( 16 Jul 2018 05:17 )             20.9                                               07-16    138  |  100  |  10  ----------------------------<  195<H>  4.2   |  22  |  0.8    Ca    7.6<L>      16 Jul 2018 05:17  Phos  3.1     07-15  Mg     2.0     07-15    TPro  4.8<L>  /  Alb  2.9<L>  /  TBili  0.5  /  DBili  x   /  AST  34  /  ALT  19  /  AlkPhos  36  07-16                                                 CARDIAC MARKERS ( 15 Jul 2018 12:58 )  x     / <0.01 ng/mL / x     / x     / x      CARDIAC MARKERS ( 15 Jul 2018 05:08 )  x     / <0.01 ng/mL / x     / x     / x                                                LIVER FUNCTIONS - ( 16 Jul 2018 05:17 )  Alb: 2.9 g/dL / Pro: 4.8 g/dL / ALK PHOS: 36 U/L / ALT: 19 U/L / AST: 34 U/L / GGT: x                                                                                                                                       MEDICATIONS  (STANDING):  dextrose 5%. 1000 milliLiter(s) (50 mL/Hr) IV Continuous <Continuous>  dextrose 50% Injectable 12.5 Gram(s) IV Push once  dextrose 50% Injectable 25 Gram(s) IV Push once  dextrose 50% Injectable 25 Gram(s) IV Push once  docusate sodium 100 milliGRAM(s) Oral two times a day  ferrous    sulfate 325 milliGRAM(s) Oral daily  insulin glargine Injectable (LANTUS) 15 Unit(s) SubCutaneous at bedtime  multivitamin 1 Tablet(s) Oral daily  pantoprazole Infusion 8 mG/Hr (10 mL/Hr) IV Continuous <Continuous>  sodium chloride 0.9%. 1000 milliLiter(s) (75 mL/Hr) IV Continuous <Continuous>    MEDICATIONS  (PRN):  dextrose 40% Gel 15 Gram(s) Oral once PRN Blood Glucose LESS THAN 70 milliGRAM(s)/deciliter  glucagon  Injectable 1 milliGRAM(s) IntraMuscular once PRN Glucose LESS THAN 70 milligrams/deciliter      Xrays:                                               no infiltrates                                      ECHO

## 2018-07-16 NOTE — PROGRESS NOTE ADULT - SUBJECTIVE AND OBJECTIVE BOX
SUBJECTIVE:    Patient is a 53y old Male who presents with a chief complaint of rectal bleed (13 Jul 2018 21:50)    Currently admitted to medicine with the primary diagnosis of Diverticulosis of colon with hemorrhage     Today is hospital day 4d. This morning he had 3 bloody BM at 11:43 pm, 12:43 Am, 3:03 AM, and around 11:30 Am. Hgb went from 7.3 to 7.2 after 1 U RBC. Currently s/p 3 U RBC. Went for EGD and colonoscopy. Dizzy when moving. CT abd canceled.  PAST MEDICAL & SURGICAL HISTORY  Diabetes mellitus, type 2  Hypertension  High blood cholesterol  S/P appendectomy    SOCIAL HISTORY:  Negative for smoking/alcohol/drug use.     ALLERGIES:  No Known Allergies    MEDICATIONS:  STANDING MEDICATIONS  dextrose 5%. 1000 milliLiter(s) IV Continuous <Continuous>  dextrose 50% Injectable 12.5 Gram(s) IV Push once  dextrose 50% Injectable 25 Gram(s) IV Push once  dextrose 50% Injectable 25 Gram(s) IV Push once  docusate sodium 100 milliGRAM(s) Oral two times a day  insulin glargine Injectable (LANTUS) 15 Unit(s) SubCutaneous at bedtime  multivitamin 1 Tablet(s) Oral daily  pantoprazole Infusion 8 mG/Hr IV Continuous <Continuous>  sodium chloride 0.9%. 1000 milliLiter(s) IV Continuous <Continuous>    PRN MEDICATIONS  dextrose 40% Gel 15 Gram(s) Oral once PRN  glucagon  Injectable 1 milliGRAM(s) IntraMuscular once PRN    VITALS:   T(F): 99.1  HR: 101  BP: 163/98  RR: 18  SpO2: 99%    LABS:                        7.2    9.82  )-----------( 213      ( 16 Jul 2018 05:17 )             20.9     07-16    138  |  100  |  10  ----------------------------<  195<H>  4.2   |  22  |  0.8    Ca    7.6<L>      16 Jul 2018 05:17  Phos  3.1     07-15  Mg     2.0     07-15    TPro  4.8<L>  /  Alb  2.9<L>  /  TBili  0.5  /  DBili  x   /  AST  34  /  ALT  19  /  AlkPhos  36  07-16              CARDIAC MARKERS ( 15 Jul 2018 12:58 )  x     / <0.01 ng/mL / x     / x     / x      CARDIAC MARKERS ( 15 Jul 2018 05:08 )  x     / <0.01 ng/mL / x     / x     / x          RADIOLOGY:    PHYSICAL EXAM:  GEN: No acute distress  LUNGS: Clear to auscultation bilaterally   HEART: Regular  ABD: Soft, non-tender, non-distended. Grossly blood BM  EXT: NC/NC/NE/2+PP/PENA/Skin Intact.   NEURO: AAOX3

## 2018-07-16 NOTE — PACU DISCHARGE NOTE - COMMENTS
Uneventful intraoperative course. Patient stable upon arrival to PACU. Report given to RN. VSS: 131/77, HR 88, RR 12, Temp 98, O2 saturation 100%

## 2018-07-16 NOTE — PROGRESS NOTE ADULT - ASSESSMENT
LGI  BLEED NO EVIDENCE OF DICERTICULITIS    BLOOD TRANSFUSION    PLAN:    Serial CBC  Transfuse if Hb <7.0  GI F/U  CT abdo today with contrast  Diet as per GI  Cont IV Protonix  Stop Ferrous sulphate  DVT prophylaxis LGI  BLEED NO EVIDENCE OF DICERTICULITIS    S/P BLOOD TRANSFUSION    PLAN:    Serial CBC  Transfuse if Hb <7.0  GI F/U/ SX / IR F/UP  CT abdo today with contrast  NPO  Cont IV Protonix  Stop Ferrous sulphate  DVT prophylaxis

## 2018-07-16 NOTE — PROGRESS NOTE ADULT - ASSESSMENT
JUVENTINO PERALTA  53y  Male  Hospital day :4d     patient presented due to bloody bowel movements and has since been transfused with 3 units of prbc to maintain his hg.     PLAN:     - Transfuse patient as needed to keep Hgb >7.     - Follow up on Hgb and vitals.      - Consult placed for GI for colonoscopy to help determine source of bleeding.

## 2018-07-16 NOTE — PROGRESS NOTE ADULT - SUBJECTIVE AND OBJECTIVE BOX
Patient is currently in ICU-setting and under the care of Pulmonary & Critical Care Medicine team. The Hospitalist Division will assume care after patient is downgraded.

## 2018-07-16 NOTE — DIETITIAN INITIAL EVALUATION ADULT. - PT NOT SOURCE
other (specify)/NPO/Clear- pt is alert and oriented, lightly lethargic, no edema. Intact skin. LBM 7/16. NPO at this time

## 2018-07-16 NOTE — CHART NOTE - NSCHARTNOTEFT_GEN_A_CORE
Patient had bright bowel movement. Repeat hgb was 7.3. Patient had another bright bowel movement after hgb was checked. Transfused 1 unit of prbc. Vitals stable.

## 2018-07-16 NOTE — DIETITIAN INITIAL EVALUATION ADULT. - ORAL INTAKE PTA
good/PTA pt eats regularly, takes vitamins for joints and heart, NKFA, and planning to eat much more healthy once d/c home.

## 2018-07-17 LAB
ALBUMIN SERPL ELPH-MCNC: 3.1 G/DL — LOW (ref 3.5–5.2)
ALP SERPL-CCNC: 36 U/L — SIGNIFICANT CHANGE UP (ref 30–115)
ALT FLD-CCNC: 17 U/L — SIGNIFICANT CHANGE UP (ref 0–41)
ANION GAP SERPL CALC-SCNC: 12 MMOL/L — SIGNIFICANT CHANGE UP (ref 7–14)
AST SERPL-CCNC: 28 U/L — SIGNIFICANT CHANGE UP (ref 0–41)
BASOPHILS # BLD AUTO: 0.03 K/UL — SIGNIFICANT CHANGE UP (ref 0–0.2)
BASOPHILS NFR BLD AUTO: 0.4 % — SIGNIFICANT CHANGE UP (ref 0–1)
BILIRUB SERPL-MCNC: 0.3 MG/DL — SIGNIFICANT CHANGE UP (ref 0.2–1.2)
BUN SERPL-MCNC: 9 MG/DL — LOW (ref 10–20)
CALCIUM SERPL-MCNC: 7.6 MG/DL — LOW (ref 8.5–10.1)
CHLORIDE SERPL-SCNC: 107 MMOL/L — SIGNIFICANT CHANGE UP (ref 98–110)
CO2 SERPL-SCNC: 24 MMOL/L — SIGNIFICANT CHANGE UP (ref 17–32)
CREAT SERPL-MCNC: 0.7 MG/DL — SIGNIFICANT CHANGE UP (ref 0.7–1.5)
EOSINOPHIL # BLD AUTO: 0.25 K/UL — SIGNIFICANT CHANGE UP (ref 0–0.7)
EOSINOPHIL NFR BLD AUTO: 3.7 % — SIGNIFICANT CHANGE UP (ref 0–8)
GLUCOSE SERPL-MCNC: 109 MG/DL — HIGH (ref 70–99)
HCT VFR BLD CALC: 22.4 % — LOW (ref 42–52)
HCT VFR BLD CALC: 23.3 % — LOW (ref 42–52)
HCT VFR BLD CALC: 23.5 % — LOW (ref 42–52)
HGB BLD-MCNC: 7.8 G/DL — LOW (ref 14–18)
HGB BLD-MCNC: 8 G/DL — LOW (ref 14–18)
HGB BLD-MCNC: 8.2 G/DL — LOW (ref 14–18)
IMM GRANULOCYTES NFR BLD AUTO: 1 % — HIGH (ref 0.1–0.3)
LYMPHOCYTES # BLD AUTO: 1.6 K/UL — SIGNIFICANT CHANGE UP (ref 1.2–3.4)
LYMPHOCYTES # BLD AUTO: 23.6 % — SIGNIFICANT CHANGE UP (ref 20.5–51.1)
MAGNESIUM SERPL-MCNC: 2.3 MG/DL — SIGNIFICANT CHANGE UP (ref 1.8–2.4)
MCHC RBC-ENTMCNC: 29.4 PG — SIGNIFICANT CHANGE UP (ref 27–31)
MCHC RBC-ENTMCNC: 29.5 PG — SIGNIFICANT CHANGE UP (ref 27–31)
MCHC RBC-ENTMCNC: 29.7 PG — SIGNIFICANT CHANGE UP (ref 27–31)
MCHC RBC-ENTMCNC: 34.3 G/DL — SIGNIFICANT CHANGE UP (ref 32–37)
MCHC RBC-ENTMCNC: 34.8 G/DL — SIGNIFICANT CHANGE UP (ref 32–37)
MCHC RBC-ENTMCNC: 34.9 G/DL — SIGNIFICANT CHANGE UP (ref 32–37)
MCV RBC AUTO: 84.8 FL — SIGNIFICANT CHANGE UP (ref 80–94)
MCV RBC AUTO: 85.1 FL — SIGNIFICANT CHANGE UP (ref 80–94)
MCV RBC AUTO: 85.7 FL — SIGNIFICANT CHANGE UP (ref 80–94)
MONOCYTES # BLD AUTO: 0.46 K/UL — SIGNIFICANT CHANGE UP (ref 0.1–0.6)
MONOCYTES NFR BLD AUTO: 6.8 % — SIGNIFICANT CHANGE UP (ref 1.7–9.3)
NEUTROPHILS # BLD AUTO: 4.38 K/UL — SIGNIFICANT CHANGE UP (ref 1.4–6.5)
NEUTROPHILS NFR BLD AUTO: 64.5 % — SIGNIFICANT CHANGE UP (ref 42.2–75.2)
NRBC # BLD: 0 /100 WBCS — SIGNIFICANT CHANGE UP (ref 0–0)
NRBC # BLD: 0 /100 WBCS — SIGNIFICANT CHANGE UP (ref 0–0)
PLATELET # BLD AUTO: 222 K/UL — SIGNIFICANT CHANGE UP (ref 130–400)
PLATELET # BLD AUTO: 225 K/UL — SIGNIFICANT CHANGE UP (ref 130–400)
PLATELET # BLD AUTO: 232 K/UL — SIGNIFICANT CHANGE UP (ref 130–400)
POTASSIUM SERPL-MCNC: 4 MMOL/L — SIGNIFICANT CHANGE UP (ref 3.5–5)
POTASSIUM SERPL-SCNC: 4 MMOL/L — SIGNIFICANT CHANGE UP (ref 3.5–5)
PROT SERPL-MCNC: 4.7 G/DL — LOW (ref 6–8)
RBC # BLD: 2.64 M/UL — LOW (ref 4.7–6.1)
RBC # BLD: 2.72 M/UL — LOW (ref 4.7–6.1)
RBC # BLD: 2.76 M/UL — LOW (ref 4.7–6.1)
RBC # FLD: 15.2 % — HIGH (ref 11.5–14.5)
RBC # FLD: 15.2 % — HIGH (ref 11.5–14.5)
RBC # FLD: 15.3 % — HIGH (ref 11.5–14.5)
SODIUM SERPL-SCNC: 143 MMOL/L — SIGNIFICANT CHANGE UP (ref 135–146)
WBC # BLD: 6.76 K/UL — SIGNIFICANT CHANGE UP (ref 4.8–10.8)
WBC # BLD: 6.79 K/UL — SIGNIFICANT CHANGE UP (ref 4.8–10.8)
WBC # BLD: 7.24 K/UL — SIGNIFICANT CHANGE UP (ref 4.8–10.8)
WBC # FLD AUTO: 6.76 K/UL — SIGNIFICANT CHANGE UP (ref 4.8–10.8)
WBC # FLD AUTO: 6.79 K/UL — SIGNIFICANT CHANGE UP (ref 4.8–10.8)
WBC # FLD AUTO: 7.24 K/UL — SIGNIFICANT CHANGE UP (ref 4.8–10.8)

## 2018-07-17 RX ORDER — PANTOPRAZOLE SODIUM 20 MG/1
40 TABLET, DELAYED RELEASE ORAL
Qty: 0 | Refills: 0 | Status: DISCONTINUED | OUTPATIENT
Start: 2018-07-17 | End: 2018-07-18

## 2018-07-17 RX ORDER — PANTOPRAZOLE SODIUM 20 MG/1
40 TABLET, DELAYED RELEASE ORAL
Qty: 0 | Refills: 0 | Status: DISCONTINUED | OUTPATIENT
Start: 2018-07-17 | End: 2018-07-17

## 2018-07-17 RX ADMIN — PANTOPRAZOLE SODIUM 40 MILLIGRAM(S): 20 TABLET, DELAYED RELEASE ORAL at 18:17

## 2018-07-17 RX ADMIN — SODIUM CHLORIDE 75 MILLILITER(S): 9 INJECTION INTRAMUSCULAR; INTRAVENOUS; SUBCUTANEOUS at 10:09

## 2018-07-17 RX ADMIN — INSULIN GLARGINE 15 UNIT(S): 100 INJECTION, SOLUTION SUBCUTANEOUS at 21:30

## 2018-07-17 RX ADMIN — Medication 1 TABLET(S): at 12:48

## 2018-07-17 NOTE — PROGRESS NOTE ADULT - ASSESSMENT
LGI  BLEED NO EVIDENCE OF DICERTICULITIS    S/P BLOOD TRANSFUSION    PLAN:    Serial CBC  Transfuse if Hb <7.0  GI F/U/ SX / IR F/UP  CTA   Cont IV Protonix  Stop Ferrous sulphate  DVT prophylaxis LGI  BLEED/ S/P EGD/ COLONOSCOPY    S/P BLOOD TRANSFUSION ALL OVER 5 UNIT PRBC    PLAN:    Serial CBC, F/UP PTT  Transfuse if Hb <7.0  GI F/U/ SX / IR F/UP  CTA   Cont IV Protonix  Stop Ferrous sulphate  OOB TO CHAIR  DVT prophylaxis

## 2018-07-17 NOTE — PROGRESS NOTE ADULT - ASSESSMENT
52yo male with history of HTN, DLD, DM presented with BRBPR    Lower GI Bleed likely sec to diverticulosis- s/p EGD - Normal , Colonoscopy - blood in colon , diverticulosis, no active source found, no intervention done.  Last Bloody BM was today morning at around 10:00 pm - Hb trended from 8.0 to 7.8  start pt on Clear liquids  cbc q12 , transfuse as needed.  If active bleeding recurs consider CT angio and IR intervention   If bleeding resolved , outpatient follow up with GI for capsule endoscopy to r/o bleeding from small bowel.  If patient continues to drop Hb will consider doing inpatient capsule endoscopy.  GI PPX    will f/up  Will discuss with attending.

## 2018-07-17 NOTE — PROGRESS NOTE ADULT - SUBJECTIVE AND OBJECTIVE BOX
GENERAL SURGERY PROGRESS NOTE    Patient: JUVENTINO PERALTA , 53y ([birth year])Male   MRN: [id number]  Location: [facility] 105 A  Visit: 07-12-18 Inpatient  Date: 07-17-18 @ 02:00    Hospital Day #: 7    Procedure/Dx/Injuries: lower GI bleed    Events of past 24 hours: Pt had one episode of bloody stool overnight. No other events overnight. Denies pain or other complaints.  Colonoscopy and EDG show diverticulosis & no active bleeding.    PAST MEDICAL & SURGICAL HISTORY:  Diabetes mellitus, type 2  Hypertension  High blood cholesterol  S/P appendectomy      Vitals: T(F): 97 (07-17-18 @ 00:04), Max: 99.1 (07-16-18 @ 12:00)  HR: 82 (07-17-18 @ 00:04)  BP: 133/70 (07-17-18 @ 00:04)  RR: 20 (07-17-18 @ 00:04)  SpO2: 98% (07-17-18 @ 00:04)      Pain (0-10):            Pain Control Adequate: [] YES [] N    Diet, NPO      Fluids:     I & O's:    07-15-18 @ 07:01  -  07-16-18 @ 07:00  --------------------------------------------------------  IN:    IV PiggyBack: 200 mL    Packed Red Blood Cells: 250 mL    pantoprazole Infusion: 230 mL    sodium chloride 0.9%.: 1200 mL  Total IN: 1880 mL    OUT:    Voided: 850 mL  Total OUT: 850 mL    Total NET: 1030 mL        Bowel Movement: : [] YES [] NO  Flatus: : [] YES [] NO    PHYSICAL EXAM  GEN:  CV:  LUNGS:  ABD:  EXT:  WOUND:  INCISION:    MEDICATIONS  (STANDING):  dextrose 5%. 1000 milliLiter(s) (50 mL/Hr) IV Continuous <Continuous>  dextrose 50% Injectable 12.5 Gram(s) IV Push once  dextrose 50% Injectable 25 Gram(s) IV Push once  dextrose 50% Injectable 25 Gram(s) IV Push once  insulin glargine Injectable (LANTUS) 15 Unit(s) SubCutaneous at bedtime  multivitamin 1 Tablet(s) Oral daily  sodium chloride 0.9%. 1000 milliLiter(s) (75 mL/Hr) IV Continuous <Continuous>    MEDICATIONS  (PRN):  dextrose 40% Gel 15 Gram(s) Oral once PRN Blood Glucose LESS THAN 70 milliGRAM(s)/deciliter  glucagon  Injectable 1 milliGRAM(s) IntraMuscular once PRN Glucose LESS THAN 70 milligrams/deciliter      DVT PROPHYLAXIS: [] YES [] NO   GI PROPHYLAXIS: [] YES [] NO   ANTICOAGULATION: [] YES [] NO   ANTIBIOTICS: [] YES [] NO       LAB/STUDIES:  CAPILLARY BLOOD GLUCOSE  123 (16 Jul 2018 21:13)  137 (16 Jul 2018 16:35)  158 (16 Jul 2018 12:00)  183 (16 Jul 2018 08:10)                              8.1    7.27  )-----------( 229      ( 16 Jul 2018 20:31 )             23.4     07-16    138  |  100  |  10  ----------------------------<  195<H>  4.2   |  22  |  0.8    Ca    7.6<L>      16 Jul 2018 05:17  Phos  3.1     07-15  Mg     2.0     07-15    TPro  4.8<L>  /  Alb  2.9<L>  /  TBili  0.5  /  DBili  x   /  AST  34  /  ALT  19  /  AlkPhos  36  07-16               4.8  | 0.5  | 34       ------------------[36      ( 16 Jul 2018 05:17 )  2.9  | x    | 19          Lipase:x      Amylase:x            CARDIAC MARKERS ( 15 Jul 2018 12:58 )  x     / <0.01 ng/mL / x     / x     / x      CARDIAC MARKERS ( 15 Jul 2018 05:08 )  x     / <0.01 ng/mL / x     / x     / x              IMAGING: GENERAL SURGERY PROGRESS NOTE    Patient: JUVENTINO PERALTA , 53y ([birth year])Male   MRN: [id number]  Location: [facility] 105 A  Visit: 07-12-18 Inpatient  Date: 07-17-18 @ 02:00    Hospital Day #: 7    Procedure/Dx/Injuries: lower GI bleed    Events of past 24 hours: Pt had one episode of bloody stool overnight. No other events overnight. Denies pain or other complaints.  Colonoscopy and EDG show diverticulosis & no active bleeding.    PAST MEDICAL & SURGICAL HISTORY:  Diabetes mellitus, type 2  Hypertension  High blood cholesterol  S/P appendectomy      Vitals: T(F): 97 (07-17-18 @ 00:04), Max: 99.1 (07-16-18 @ 12:00)  HR: 82 (07-17-18 @ 00:04)  BP: 133/70 (07-17-18 @ 00:04)  RR: 20 (07-17-18 @ 00:04)  SpO2: 98% (07-17-18 @ 00:04)      Pain (0-10):            Pain Control Adequate: [] YES [] N    Diet, NPO      Fluids:     I & O's:    07-15-18 @ 07:01  -  07-16-18 @ 07:00  --------------------------------------------------------  IN:    IV PiggyBack: 200 mL    Packed Red Blood Cells: 250 mL    pantoprazole Infusion: 230 mL    sodium chloride 0.9%.: 1200 mL  Total IN: 1880 mL    OUT:    Voided: 850 mL  Total OUT: 850 mL    Total NET: 1030 mL        Bowel Movement: : [x] YES [] NO  Flatus: : [x] YES [] NO    PHYSICAL EXAM  GEN:A&O x3  CV: RRR  ABD:soft nt, nd      MEDICATIONS  (STANDING):  dextrose 5%. 1000 milliLiter(s) (50 mL/Hr) IV Continuous <Continuous>  dextrose 50% Injectable 12.5 Gram(s) IV Push once  dextrose 50% Injectable 25 Gram(s) IV Push once  dextrose 50% Injectable 25 Gram(s) IV Push once  insulin glargine Injectable (LANTUS) 15 Unit(s) SubCutaneous at bedtime  multivitamin 1 Tablet(s) Oral daily  sodium chloride 0.9%. 1000 milliLiter(s) (75 mL/Hr) IV Continuous <Continuous>    MEDICATIONS  (PRN):  dextrose 40% Gel 15 Gram(s) Oral once PRN Blood Glucose LESS THAN 70 milliGRAM(s)/deciliter  glucagon  Injectable 1 milliGRAM(s) IntraMuscular once PRN Glucose LESS THAN 70 milligrams/deciliter      DVT PROPHYLAXIS: [] YES [] NO   GI PROPHYLAXIS: [] YES [] NO   ANTICOAGULATION: [] YES [] NO   ANTIBIOTICS: [] YES [] NO       LAB/STUDIES:  CAPILLARY BLOOD GLUCOSE  123 (16 Jul 2018 21:13)  137 (16 Jul 2018 16:35)  158 (16 Jul 2018 12:00)  183 (16 Jul 2018 08:10)                              8.1    7.27  )-----------( 229      ( 16 Jul 2018 20:31 )             23.4     07-16    138  |  100  |  10  ----------------------------<  195<H>  4.2   |  22  |  0.8    Ca    7.6<L>      16 Jul 2018 05:17  Phos  3.1     07-15  Mg     2.0     07-15    TPro  4.8<L>  /  Alb  2.9<L>  /  TBili  0.5  /  DBili  x   /  AST  34  /  ALT  19  /  AlkPhos  36  07-16               4.8  | 0.5  | 34       ------------------[36      ( 16 Jul 2018 05:17 )  2.9  | x    | 19          Lipase:x      Amylase:x            CARDIAC MARKERS ( 15 Jul 2018 12:58 )  x     / <0.01 ng/mL / x     / x     / x      CARDIAC MARKERS ( 15 Jul 2018 05:08 )  x     / <0.01 ng/mL / x     / x     / x              IMAGING:

## 2018-07-17 NOTE — PROGRESS NOTE ADULT - ASSESSMENT
54y/o M w/ lower GI bleed    PLAN:  - transfuse as necessary  - trend hgb 54y/o M w/ lower GI bleed    PLAN:  - transfuse as necessary  - trend hgb  -if continues to experience bloody stools, would recommend provocative angiography or capsule endoscopy (outpatient) 52y/o M w/ lower GI bleed    PLAN:  - transfuse as necessary  - trend hgb  -if continues to experience bloody stools, would recommend provocative angiography or capsule endoscopy

## 2018-07-17 NOTE — PROGRESS NOTE ADULT - SUBJECTIVE AND OBJECTIVE BOX
GI HPI Today:  Patient is a 53y old  Male who presents with a chief complaint of rectal bleed (13 Jul 2018 21:50)  54yo male with history of HTN, DLD, DM presented to  ER on 11 july at around 11:30 pm due to episodes of rectal bleeding that started on same day since 1900. He had further episodes of BRBPR in our ER. Only associated symptom is a gurgling sensation in his stomach prior to each bowel movement. He denies abdominal pain, nausea, vomiting or fevers. No prior history of GI bleeding. Pt never had EGD and colonoscopy.  He denies any history of diverticulitis in the past. Patient denies any use of NSAIDS and blood thinners such as aspirin.   He was initially admitted to [facility] where he continued to have BRBPR underwent CT Angio which did not show any intraluminal bleeding but showed very mild diverticulitis in sigmoid colon. Pt was started on IV antibiotics.   Then his GI bleeding was resolved and pt diet was advanced to solid food. On Saturday (7/14/18) night pt had 2-3 episodes of BRBPR and c/o weakness, fatigue. Drop in Hb.  Transfused 2 units prbc and upgraded to unit. Yesterday pt underwent colonoscopy found dark maroon coloured and bright red blood in colon with mod - severe diverticulosis . EGD - normal.   Last night pt had one episode of maroon coloured BM , received 1 unit prbc.      PAST MEDICAL & SURGICAL HISTORY  Diabetes mellitus, type 2  Hypertension  High blood cholesterol  S/P appendectomy      ALLERGIES:  No Known Allergies      MEDICATIONS:  MEDICATIONS  (STANDING):  dextrose 5%. 1000 milliLiter(s) (50 mL/Hr) IV Continuous <Continuous>  dextrose 50% Injectable 12.5 Gram(s) IV Push once  dextrose 50% Injectable 25 Gram(s) IV Push once  dextrose 50% Injectable 25 Gram(s) IV Push once  insulin glargine Injectable (LANTUS) 15 Unit(s) SubCutaneous at bedtime  multivitamin 1 Tablet(s) Oral daily  pantoprazole  Injectable 40 milliGRAM(s) IV Push two times a day  sodium chloride 0.9%. 1000 milliLiter(s) (75 mL/Hr) IV Continuous <Continuous>    MEDICATIONS  (PRN):  dextrose 40% Gel 15 Gram(s) Oral once PRN Blood Glucose LESS THAN 70 milliGRAM(s)/deciliter  glucagon  Injectable 1 milliGRAM(s) IntraMuscular once PRN Glucose LESS THAN 70 milligrams/deciliter             VITALS:   T(F): 97.7 (07-17 @ 08:00), Max: 99.5 (07-14 @ 22:10)  HR: 80 (07-17 @ 14:00) (72 - 107)  BP: 138/80 (07-17 @ 14:00) (98/49 - 163/98)  BP(mean): 101 (07-17 @ 14:00) (65 - 123)  RR: 24 (07-17 @ 14:00) (0 - 27)  SpO2: 95% (07-17 @ 14:00) (94% - 100%)        PHYSICAL EXAM:  EYES: No scleral icterus   LUNG: Clear to auscultation bilaterally; No rales, rhonchi, wheezing, or rubs  HEART: RRR; No murmurs  ABDOMEN: Soft, +BS, No Abdominal Tenderness, No guarding, No Sánchez Sign   Rectal Exam: Not performed.      Blood Work :                        7.8    6.76  )-----------( 225      ( 17 Jul 2018 11:26 )             22.4       07-17    143  |  107  |  9<L>  ----------------------------<  109<H>  4.0   |  24  |  0.7    Ca    7.6<L>      17 Jul 2018 04:14  Phos  3.1     07-15  Mg     2.3     07-17      CBC -  ( 17 Jul 2018 11:26 )  Hemoglobin : 7.8    CBC -  ( 17 Jul 2018 04:14 )  Hemoglobin : 8.0    CBC -  ( 16 Jul 2018 20:31 )  Hemoglobin : 8.1    CBC -  ( 16 Jul 2018 05:17 )  Hemoglobin : 7.2    CBC -  ( 16 Jul 2018 00:35 )  Hemoglobin : 7.3    CBC -  ( 15 Jul 2018 12:58 )  Hemoglobin : 7.8    CBC -  ( 15 Jul 2018 05:08 )  Hemoglobin : 7.6    CBC -  ( 14 Jul 2018 18:14 )  Hemoglobin : 7.2    CBC -  ( 14 Jul 2018 08:08 )  Hemoglobin : 8.1    CBC -  ( 13 Jul 2018 21:15 )  Hemoglobin : 8.2      LIVER FUNCTIONS - ( 17 Jul 2018 04:14 )  Alb: 3.1 [3.5 - 5.2] / Pro: 4.7 [6.0 - 8.0] / ALK PHOS: 36 [30 - 115] / ALT: 17 [0 - 41] / AST: 28 [0 - 41] / GGT: x     LIVER FUNCTIONS - ( 16 Jul 2018 05:17 )  Alb: 2.9 [3.5 - 5.2] / Pro: 4.8 [6.0 - 8.0] / ALK PHOS: 36 [30 - 115] / ALT: 19 [0 - 41] / AST: 34 [0 - 41] / GGT: x     LIVER FUNCTIONS - ( 14 Jul 2018 08:08 )  Alb: 3.4 [3.5 - 5.2] / Pro: 5.4 [6.0 - 8.0] / ALK PHOS: 45 [30 - 115] / ALT: 15 [0 - 41] / AST: 24 [0 - 41] / GGT: x     LIVER FUNCTIONS - ( 11 Jul 2018 23:06 )  Alb: 4.3 [3.5 - 5.2] / Pro: 6.9 [6.0 - 8.0] / ALK PHOS: 56 [30 - 115] / ALT: 18 [0 - 41] / AST: 18 [0 - 41] / GGT: x         RADIOLOGY:

## 2018-07-17 NOTE — PROGRESS NOTE ADULT - ASSESSMENT
# NO DIVERTICULITIS - stop ABX    # Anemia is from acute blood loss; hgb fell again, s/p 3 U RBC 7/17  d/w GI- NPO for at least 24 hr, if active bleed-get CT angio and call IR  EGD today- no bleed  colonoscopy today- diverticulosis, no active bleed    stop fe 325mg po q24   stop colace 100 q12   MVI q24 til hgb > 10  check cbc 2x/day  prev CT angio was neg for active bleeding site  CT abd cancelled today       # DM T2 no complicated  lant 15 - might need to inc as FS not controlled (but also not eating)  hold lispro until eating again  back to usual home meds on d/c  recent A1c 6.9    # Morbid Obesity  lose wt    # HTN  hold valsartan until BP stable # NO DIVERTICULITIS - stop ABX    # Anemia is from acute blood loss; hgb stable 7.8  If hgb <7 or symptomatic, transfuse. s/p 3 U RBC 7/17  d/w GI- NPO for at least 24 hr  If active bleed-get CT angio w/ IV contrast again and call IR  CT angio 7/12 was neg for active bleeding site  EGD 7/17- no bleed  colonoscopy 7/17- diverticulosis, no active bleed    MVI q24 til hgb > 10  check cbc 2x/day  advance to full liquid diet  OOB to chair  Echo- no echo on record      # DM T2 no complicated  lant 15 - might need to inc as FS not controlled (but also not eating)  hold lispro until eating again  back to usual home meds on d/c  recent A1c 6.9      # HTN  hold valsartan until BP stable # NO DIVERTICULITIS - stop ABX    # Anemia is from acute blood loss; hgb stable 7.8  If hgb <7 or symptomatic, transfuse. s/p 3 U RBC 7/17  d/w GI- protonix 40 qd po.  clear liquid diet  If active bleed-get CT angio w/ IV contrast again and call IR  or capsule endoscopy to eval small bowel  Possible outpt capsule endoscopy if no bleed  CT angio 7/12 was neg for active bleeding site  EGD 7/17- no bleed  colonoscopy 7/17- diverticulosis, no active bleed    MVI q24 til hgb > 10  check cbc 2x/day  advance to full liquid diet  OOB to chair  Echo- no echo on record      # DM T2 no complicated  lant 15 - might need to inc as FS not controlled (but also not eating)  hold lispro until eating again  back to usual home meds on d/c  recent A1c 6.9      # HTN  hold valsartan until BP stable # NO DIVERTICULITIS - stop ABX    # Anemia is from acute blood loss; hgb stable 7.8  If hgb <7 or symptomatic, transfuse. s/p 3 U RBC 7/17  d/w GI- protonix 40 qd po  If active bleed-get CT angio w/ IV contrast again and call IR  or capsule endoscopy to eval small bowel  advance to clear liquid diet  Possible outpt capsule endoscopy if no bleed  CT angio 7/12 was neg for active bleeding site  EGD 7/17- no bleed  colonoscopy 7/17- diverticulosis, no active bleed    MVI q24 til hgb > 10  check cbc 2x/day  OOB to chair  Echo- no echo on record      # DM T2 no complicated  lant 15 - might need to inc as FS not controlled (but also not eating)  hold lispro until eating again  back to usual home meds on d/c  recent A1c 6.9      # HTN  hold valsartan until BP stable

## 2018-07-17 NOTE — PROGRESS NOTE ADULT - SUBJECTIVE AND OBJECTIVE BOX
Over Night Events:        ROS:  See HPI    PHYSICAL EXAM    ICU Vital Signs Last 24 Hrs  T(C): 36.5 (17 Jul 2018 08:00), Max: 37.3 (16 Jul 2018 12:00)  T(F): 97.7 (17 Jul 2018 08:00), Max: 99.1 (16 Jul 2018 12:00)  HR: 74 (17 Jul 2018 08:00) (72 - 101)  BP: 125/76 (17 Jul 2018 06:00) (98/59 - 163/98)  BP(mean): 93 (17 Jul 2018 06:00) (70 - 123)  ABP: --  ABP(mean): --  RR: 14 (17 Jul 2018 08:00) (12 - 22)  SpO2: 95% (17 Jul 2018 08:00) (94% - 100%)      General:  HEENT:                Lymph Nodes: NO cervical LN   Lungs: Bilateral BS  Cardiovascular: Regular   Abdomen: Soft, Positive BS  Extremities: No clubbing   Skin:   Neurological:       LABS:                          8.0    7.24  )-----------( 222      ( 17 Jul 2018 04:14 )             23.3                                               07-17    143  |  107  |  9<L>  ----------------------------<  109<H>  4.0   |  24  |  0.7    Ca    7.6<L>      17 Jul 2018 04:14  Mg     2.3     07-17    TPro  4.7<L>  /  Alb  3.1<L>  /  TBili  0.3  /  DBili  x   /  AST  28  /  ALT  17  /  AlkPhos  36  07-17                                                 CARDIAC MARKERS ( 15 Jul 2018 12:58 )  x     / <0.01 ng/mL / x     / x     / x                                                LIVER FUNCTIONS - ( 17 Jul 2018 04:14 )  Alb: 3.1 g/dL / Pro: 4.7 g/dL / ALK PHOS: 36 U/L / ALT: 17 U/L / AST: 28 U/L / GGT: x                                                                                                                                       MEDICATIONS  (STANDING):  dextrose 5%. 1000 milliLiter(s) (50 mL/Hr) IV Continuous <Continuous>  dextrose 50% Injectable 12.5 Gram(s) IV Push once  dextrose 50% Injectable 25 Gram(s) IV Push once  dextrose 50% Injectable 25 Gram(s) IV Push once  insulin glargine Injectable (LANTUS) 15 Unit(s) SubCutaneous at bedtime  multivitamin 1 Tablet(s) Oral daily  sodium chloride 0.9%. 1000 milliLiter(s) (75 mL/Hr) IV Continuous <Continuous>    MEDICATIONS  (PRN):  dextrose 40% Gel 15 Gram(s) Oral once PRN Blood Glucose LESS THAN 70 milliGRAM(s)/deciliter  glucagon  Injectable 1 milliGRAM(s) IntraMuscular once PRN Glucose LESS THAN 70 milligrams/deciliter      Xrays:                                                                                     ECHO Over Night Events:    S/P EGD/ COLONOSCOPY/ TX SPOKE WITH GI    ROS:  See HPI    PHYSICAL EXAM    ICU Vital Signs Last 24 Hrs  T(C): 36.5 (17 Jul 2018 08:00), Max: 37.3 (16 Jul 2018 12:00)  T(F): 97.7 (17 Jul 2018 08:00), Max: 99.1 (16 Jul 2018 12:00)  HR: 74 (17 Jul 2018 08:00) (72 - 101)  BP: 125/76 (17 Jul 2018 06:00) (98/59 - 163/98)  BP(mean): 93 (17 Jul 2018 06:00) (70 - 123)  ABP: --  ABP(mean): --  RR: 14 (17 Jul 2018 08:00) (12 - 22)  SpO2: 95% (17 Jul 2018 08:00) (94% - 100%)      General:  HEENT: AXO/3               Lymph Nodes: NO cervical LN   Lungs: Bilateral BS  Cardiovascular: Regular   Abdomen: Soft, Positive BS  Extremities: No clubbing         LABS:                          8.0    7.24  )-----------( 222      ( 17 Jul 2018 04:14 )             23.3                                               07-17    143  |  107  |  9<L>  ----------------------------<  109<H>  4.0   |  24  |  0.7    Ca    7.6<L>      17 Jul 2018 04:14  Mg     2.3     07-17    TPro  4.7<L>  /  Alb  3.1<L>  /  TBili  0.3  /  DBili  x   /  AST  28  /  ALT  17  /  AlkPhos  36  07-17                                                 CARDIAC MARKERS ( 15 Jul 2018 12:58 )  x     / <0.01 ng/mL / x     / x     / x                                                LIVER FUNCTIONS - ( 17 Jul 2018 04:14 )  Alb: 3.1 g/dL / Pro: 4.7 g/dL / ALK PHOS: 36 U/L / ALT: 17 U/L / AST: 28 U/L / GGT: x                                                                                                                                       MEDICATIONS  (STANDING):  dextrose 5%. 1000 milliLiter(s) (50 mL/Hr) IV Continuous <Continuous>  dextrose 50% Injectable 12.5 Gram(s) IV Push once  dextrose 50% Injectable 25 Gram(s) IV Push once  dextrose 50% Injectable 25 Gram(s) IV Push once  insulin glargine Injectable (LANTUS) 15 Unit(s) SubCutaneous at bedtime  multivitamin 1 Tablet(s) Oral daily  sodium chloride 0.9%. 1000 milliLiter(s) (75 mL/Hr) IV Continuous <Continuous>    MEDICATIONS  (PRN):  dextrose 40% Gel 15 Gram(s) Oral once PRN Blood Glucose LESS THAN 70 milliGRAM(s)/deciliter  glucagon  Injectable 1 milliGRAM(s) IntraMuscular once PRN Glucose LESS THAN 70 milligrams/deciliter      Xrays:  reviewed

## 2018-07-17 NOTE — CHART NOTE - NSCHARTNOTEFT_GEN_A_CORE
52yo male presents to the ER due to episodes of rectal bleeding 10-12 episodes. He denies abdominal pain, nausea, vomiting or fevers. CT angio 7/12 was neg for active bleeding site. EGD 7/17- no bleed. Colonoscopy 7/17- diverticulosis, no active bleed. Hgb stable 7.8. s/p 3 U RBC 7/17. If active bleed-get CT angio w/ IV contrast again and call IR. Check CBC 2x/day. Transfuse if hgb <7 or symptomatic. Patient had 2 BM today that were darker, less bloody and more formed. We are advancing his diet to full liquid.    Plan:      # Anemia is from acute blood loss;   If hgb <7 or symptomatic, transfuse.   d/w GI- NPO for at least 24 hr  If active bleed-get CT angio w/ IV contrast again and call IR  MVI q24 til hgb > 10  check cbc 2x/day  advance to full liquid diet  OOB to chair  Echo- no echo on record    # NO DIVERTICULITIS - stop ABX    # DM T2 no complicated  lant 15 - might need to inc as FS not controlled (but also not eating)  hold lispro until eating again  back to usual home meds on d/c  recent A1c 6.9      # HTN  hold valsartan until BP stable 52yo male presents to the ER due to episodes of rectal bleeding 10-12 episodes. He denies abdominal pain, nausea, vomiting or fevers. CT angio 7/12 was neg for active bleeding site. EGD 7/17- no bleed. Colonoscopy 7/17- diverticulosis, no active bleed. Hgb stable 7.8. s/p 3 U RBC 7/17. If active bleed-get CT angio w/ IV contrast again and call IR. Check CBC 2x/day. Transfuse if hgb <7 or symptomatic. Patient had 2 BM today that were darker, less bloody and more formed. We are advancing his diet to full liquid.    Plan:    # NO DIVERTICULITIS - stop ABX    # Anemia is from acute blood loss; hgb stable 7.8  If hgb <7 or symptomatic, transfuse. s/p 3 U RBC 7/17  d/w GI- protonix 40 qd po.  clear liquid diet  If active bleed-get CT angio w/ IV contrast again and call IR  or capsule endoscopy to eval small bowel  Possible outpt capsule endoscopy if no bleed  CT angio 7/12 was neg for active bleeding site  EGD 7/17- no bleed  colonoscopy 7/17- diverticulosis, no active bleed    MVI q24 til hgb > 10  check cbc 2x/day  advance to full liquid diet  OOB to chair  Echo- no echo on record      # DM T2 no complicated  lant 15 - might need to inc as FS not controlled (but also not eating)  hold lispro until eating again  back to usual home meds on d/c  recent A1c 6.9      # HTN  hold valsartan until BP stable 54yo male presents to the ER due to episodes of rectal bleeding 10-12 episodes. He denies abdominal pain, nausea, vomiting or fevers. CT angio 7/12 was neg for active bleeding site. EGD 7/17- no bleed. Colonoscopy 7/17- diverticulosis, no active bleed. Hgb stable 7.8. s/p 3 U RBC 7/17. If active bleed-get CT angio w/ IV contrast again and call IR or inpatient capsule endoscopy to eval small bowel. Check CBC 2x/day. Transfuse if hgb <7 or symptomatic. Patient had 2 BM today that were darker, less bloody and more formed. We are advancing his diet to full liquid.    # NO DIVERTICULITIS - stop ABX    # Anemia is from acute blood loss; hgb stable 7.8  If hgb <7 or symptomatic, transfuse. s/p 3 U RBC 7/17  d/w GI- protonix 40 qd po  If active bleed-get CT angio w/ IV contrast again and call IR  or capsule endoscopy to eval small bowel  advance to clear liquid diet  Possible outpt capsule endoscopy if no bleed  CT angio 7/12 was neg for active bleeding site  EGD 7/17- no bleed  colonoscopy 7/17- diverticulosis, no active bleed    MVI q24 til hgb > 10  check cbc 2x/day  OOB to chair  Echo- no echo on record      # DM T2 no complicated  lant 15 - might need to inc as FS not controlled (but also not eating)  hold lispro until eating again  back to usual home meds on d/c  recent A1c 6.9      # HTN  hold valsartan until BP stable

## 2018-07-17 NOTE — PROGRESS NOTE ADULT - SUBJECTIVE AND OBJECTIVE BOX
SUBJECTIVE:    Patient is a 53y old Male who presents with a chief complaint of rectal bleed (13 Jul 2018 21:50)    Currently admitted to medicine with the primary diagnosis of Diverticulosis of colon with hemorrhage     Today is hospital day 5d. This morning he reported BM at 10 pm and 8 am that is more formed, less blood, darker.  Denies dizziness/weakness, GI pain. No new issues or overnight events.     PAST MEDICAL & SURGICAL HISTORY  Diabetes mellitus, type 2  Hypertension  High blood cholesterol  S/P appendectomy    SOCIAL HISTORY:  Negative for smoking/alcohol/drug use.     ALLERGIES:  No Known Allergies    MEDICATIONS:  STANDING MEDICATIONS  dextrose 5%. 1000 milliLiter(s) IV Continuous <Continuous>  dextrose 50% Injectable 12.5 Gram(s) IV Push once  dextrose 50% Injectable 25 Gram(s) IV Push once  dextrose 50% Injectable 25 Gram(s) IV Push once  insulin glargine Injectable (LANTUS) 15 Unit(s) SubCutaneous at bedtime  multivitamin 1 Tablet(s) Oral daily  pantoprazole  Injectable 40 milliGRAM(s) IV Push two times a day  sodium chloride 0.9%. 1000 milliLiter(s) IV Continuous <Continuous>    PRN MEDICATIONS  dextrose 40% Gel 15 Gram(s) Oral once PRN  glucagon  Injectable 1 milliGRAM(s) IntraMuscular once PRN    VITALS:   T(F): 97.7  HR: 84  BP: 150/86  RR: 19  SpO2: 99%    LABS:                        7.8    6.76  )-----------( 225      ( 17 Jul 2018 11:26 )             22.4     07-17    143  |  107  |  9<L>  ----------------------------<  109<H>  4.0   |  24  |  0.7    Ca    7.6<L>      17 Jul 2018 04:14  Mg     2.3     07-17    TPro  4.7<L>  /  Alb  3.1<L>  /  TBili  0.3  /  DBili  x   /  AST  28  /  ALT  17  /  AlkPhos  36  07-17                  RADIOLOGY:    PHYSICAL EXAM:  GEN: No acute distress  LUNGS: Clear to auscultation bilaterally   HEART: Regular  ABD: Soft, non-tender, non-distended.  EXT: NC/NC/NE/2+PP/PENA/Skin Intact.   NEURO: AAOX3

## 2018-07-18 VITALS
SYSTOLIC BLOOD PRESSURE: 144 MMHG | TEMPERATURE: 99 F | DIASTOLIC BLOOD PRESSURE: 85 MMHG | HEART RATE: 86 BPM | RESPIRATION RATE: 15 BRPM

## 2018-07-18 LAB
ALBUMIN SERPL ELPH-MCNC: 3.4 G/DL — LOW (ref 3.5–5.2)
ALP SERPL-CCNC: 39 U/L — SIGNIFICANT CHANGE UP (ref 30–115)
ALT FLD-CCNC: 22 U/L — SIGNIFICANT CHANGE UP (ref 0–41)
ANION GAP SERPL CALC-SCNC: 12 MMOL/L — SIGNIFICANT CHANGE UP (ref 7–14)
AST SERPL-CCNC: 37 U/L — SIGNIFICANT CHANGE UP (ref 0–41)
BASOPHILS # BLD AUTO: 0.02 K/UL — SIGNIFICANT CHANGE UP (ref 0–0.2)
BASOPHILS NFR BLD AUTO: 0.4 % — SIGNIFICANT CHANGE UP (ref 0–1)
BILIRUB SERPL-MCNC: 0.4 MG/DL — SIGNIFICANT CHANGE UP (ref 0.2–1.2)
BUN SERPL-MCNC: 4 MG/DL — LOW (ref 10–20)
CALCIUM SERPL-MCNC: 8.1 MG/DL — LOW (ref 8.5–10.1)
CHLORIDE SERPL-SCNC: 106 MMOL/L — SIGNIFICANT CHANGE UP (ref 98–110)
CO2 SERPL-SCNC: 26 MMOL/L — SIGNIFICANT CHANGE UP (ref 17–32)
CREAT SERPL-MCNC: 0.8 MG/DL — SIGNIFICANT CHANGE UP (ref 0.7–1.5)
EOSINOPHIL # BLD AUTO: 0.18 K/UL — SIGNIFICANT CHANGE UP (ref 0–0.7)
EOSINOPHIL NFR BLD AUTO: 3.4 % — SIGNIFICANT CHANGE UP (ref 0–8)
GLUCOSE SERPL-MCNC: 163 MG/DL — HIGH (ref 70–99)
HCT VFR BLD CALC: 24.2 % — LOW (ref 42–52)
HGB BLD-MCNC: 8.2 G/DL — LOW (ref 14–18)
IMM GRANULOCYTES NFR BLD AUTO: 1.1 % — HIGH (ref 0.1–0.3)
LYMPHOCYTES # BLD AUTO: 1.2 K/UL — SIGNIFICANT CHANGE UP (ref 1.2–3.4)
LYMPHOCYTES # BLD AUTO: 22.9 % — SIGNIFICANT CHANGE UP (ref 20.5–51.1)
MAGNESIUM SERPL-MCNC: 2.1 MG/DL — SIGNIFICANT CHANGE UP (ref 1.8–2.4)
MCHC RBC-ENTMCNC: 29.2 PG — SIGNIFICANT CHANGE UP (ref 27–31)
MCHC RBC-ENTMCNC: 33.9 G/DL — SIGNIFICANT CHANGE UP (ref 32–37)
MCV RBC AUTO: 86.1 FL — SIGNIFICANT CHANGE UP (ref 80–94)
MONOCYTES # BLD AUTO: 0.41 K/UL — SIGNIFICANT CHANGE UP (ref 0.1–0.6)
MONOCYTES NFR BLD AUTO: 7.8 % — SIGNIFICANT CHANGE UP (ref 1.7–9.3)
NEUTROPHILS # BLD AUTO: 3.38 K/UL — SIGNIFICANT CHANGE UP (ref 1.4–6.5)
NEUTROPHILS NFR BLD AUTO: 64.4 % — SIGNIFICANT CHANGE UP (ref 42.2–75.2)
PLATELET # BLD AUTO: 253 K/UL — SIGNIFICANT CHANGE UP (ref 130–400)
POTASSIUM SERPL-MCNC: 3.8 MMOL/L — SIGNIFICANT CHANGE UP (ref 3.5–5)
POTASSIUM SERPL-SCNC: 3.8 MMOL/L — SIGNIFICANT CHANGE UP (ref 3.5–5)
PROT SERPL-MCNC: 5.1 G/DL — LOW (ref 6–8)
RBC # BLD: 2.81 M/UL — LOW (ref 4.7–6.1)
RBC # FLD: 16.1 % — HIGH (ref 11.5–14.5)
SODIUM SERPL-SCNC: 144 MMOL/L — SIGNIFICANT CHANGE UP (ref 135–146)
WBC # BLD: 5.25 K/UL — SIGNIFICANT CHANGE UP (ref 4.8–10.8)
WBC # FLD AUTO: 5.25 K/UL — SIGNIFICANT CHANGE UP (ref 4.8–10.8)

## 2018-07-18 RX ADMIN — SODIUM CHLORIDE 75 MILLILITER(S): 9 INJECTION INTRAMUSCULAR; INTRAVENOUS; SUBCUTANEOUS at 05:39

## 2018-07-18 RX ADMIN — PANTOPRAZOLE SODIUM 40 MILLIGRAM(S): 20 TABLET, DELAYED RELEASE ORAL at 05:37

## 2018-07-18 RX ADMIN — Medication 1 TABLET(S): at 11:52

## 2018-07-18 NOTE — PROGRESS NOTE ADULT - SUBJECTIVE AND OBJECTIVE BOX
GI HPI Today:    Patient is a 53y old  Male who presents with a chief complaint of rectal bleed (13 Jul 2018 21:50)  54yo male with history of HTN, DLD, DM presented to  ER on 11 july at around 11:30 pm due to episodes of rectal bleeding that started on same day since 1900. He had further episodes of BRBPR in our ER. Only associated symptom is a gurgling sensation in his stomach prior to each bowel movement. He denies abdominal pain, nausea, vomiting or fevers. No prior history of GI bleeding. Pt never had EGD and colonoscopy.  He denies any history of diverticulitis in the past. Patient denies any use of NSAIDS and blood thinners such as aspirin.   He was initially admitted to [facility] where he continued to have BRBPR underwent CT Angio which did not show any intraluminal bleeding but showed very mild diverticulitis in sigmoid colon. Pt was started on IV antibiotics.   Then his GI bleeding was resolved and pt diet was advanced to solid food. On Saturday (7/14/18) night pt had 2-3 episodes of BRBPR and c/o weakness, fatigue. Drop in Hb.  Transfused 2 units prbc and upgraded to unit. Pt underwent colonoscopy found dark maroon coloured and bright red blood in colon with mod - severe diverticulosis . EGD - normal.     No bloody BM, pt tolerating liquid diet.       PAST MEDICAL & SURGICAL HISTORY  Diabetes mellitus, type 2  Hypertension  High blood cholesterol  S/P appendectomy      ALLERGIES:  No Known Allergies      MEDICATIONS:  MEDICATIONS  (STANDING):  dextrose 5%. 1000 milliLiter(s) (50 mL/Hr) IV Continuous <Continuous>  dextrose 50% Injectable 12.5 Gram(s) IV Push once  dextrose 50% Injectable 25 Gram(s) IV Push once  dextrose 50% Injectable 25 Gram(s) IV Push once  insulin glargine Injectable (LANTUS) 15 Unit(s) SubCutaneous at bedtime  multivitamin 1 Tablet(s) Oral daily  pantoprazole    Tablet 40 milliGRAM(s) Oral before breakfast    MEDICATIONS  (PRN):  dextrose 40% Gel 15 Gram(s) Oral once PRN Blood Glucose LESS THAN 70 milliGRAM(s)/deciliter  glucagon  Injectable 1 milliGRAM(s) IntraMuscular once PRN Glucose LESS THAN 70 milligrams/deciliter      REVIEW OF SYSTEMS:    CONSTITUTIONAL: No weakness, fatigue, malaise, fevers or chills, no weight change, appetite change  Throat: No Dysphagia, No Odynophagia  RESPIRATORY: No SOB  CARDIOVASCULAR: No chest pain   Muscloskeletal: no joints pain  NEUROLOGICAL: No syncope or diziness  SKIN: No pruritis  Heme/Lymph: no LN enlargement         VITALS:   T(F): 98.7 (07-18 @ 12:19), Max: 99.1 (07-16 @ 12:00)  HR: 86 (07-18 @ 12:19) (72 - 104)  BP: 144/85 (07-18 @ 12:19) (98/59 - 163/98)  BP(mean): 109 (07-18 @ 00:00) (70 - 123)  RR: 15 (07-18 @ 12:19) (12 - 28)  SpO2: 96% (07-18 @ 01:00) (94% - 100%)        PHYSICAL EXAM:  Gen: Pt looks comfortable.   EYES: No scleral icterus   LUNG: Clear to auscultation bilaterally; No rales, rhonchi, wheezing, or rubs  HEART: RRR; No murmurs  ABDOMEN: Soft, +BS,  NO Abdominal Tenderness, No guarding, No Sánchez Sign   Rectal Exam: not performed     Blood Work :                        8.2    5.25  )-----------( 253      ( 18 Jul 2018 09:08 )             24.2       07-18    144  |  106  |  4<L>  ----------------------------<  163<H>  3.8   |  26  |  0.8    Ca    8.1<L>      18 Jul 2018 09:08  Phos  3.1     07-15  Mg     2.1     07-18      CBC -  ( 18 Jul 2018 09:08 )  Hemoglobin : 8.2    CBC -  ( 17 Jul 2018 16:20 )  Hemoglobin : 8.2    CBC -  ( 17 Jul 2018 11:26 )  Hemoglobin : 7.8    CBC -  ( 17 Jul 2018 04:14 )  Hemoglobin : 8.0    CBC -  ( 16 Jul 2018 20:31 )  Hemoglobin : 8.1    CBC -  ( 16 Jul 2018 05:17 )  Hemoglobin : 7.2    CBC -  ( 16 Jul 2018 00:35 )  Hemoglobin : 7.3    CBC -  ( 15 Jul 2018 12:58 )  Hemoglobin : 7.8    CBC -  ( 15 Jul 2018 05:08 )  Hemoglobin : 7.6    CBC -  ( 14 Jul 2018 18:14 )  Hemoglobin : 7.2      LIVER FUNCTIONS - ( 18 Jul 2018 09:08 )  Alb: 3.4 [3.5 - 5.2] / Pro: 5.1 [6.0 - 8.0] / ALK PHOS: 39 [30 - 115] / ALT: 22 [0 - 41] / AST: 37 [0 - 41] / GGT: x     LIVER FUNCTIONS - ( 17 Jul 2018 04:14 )  Alb: 3.1 [3.5 - 5.2] / Pro: 4.7 [6.0 - 8.0] / ALK PHOS: 36 [30 - 115] / ALT: 17 [0 - 41] / AST: 28 [0 - 41] / GGT: x     LIVER FUNCTIONS - ( 16 Jul 2018 05:17 )  Alb: 2.9 [3.5 - 5.2] / Pro: 4.8 [6.0 - 8.0] / ALK PHOS: 36 [30 - 115] / ALT: 19 [0 - 41] / AST: 34 [0 - 41] / GGT: x     LIVER FUNCTIONS - ( 14 Jul 2018 08:08 )  Alb: 3.4 [3.5 - 5.2] / Pro: 5.4 [6.0 - 8.0] / ALK PHOS: 45 [30 - 115] / ALT: 15 [0 - 41] / AST: 24 [0 - 41] / GGT: x     LIVER FUNCTIONS - ( 11 Jul 2018 23:06 )  Alb: 4.3 [3.5 - 5.2] / Pro: 6.9 [6.0 - 8.0] / ALK PHOS: 56 [30 - 115] / ALT: 18 [0 - 41] / AST: 18 [0 - 41] / GGT: x         RADIOLOGY:

## 2018-07-18 NOTE — PROGRESS NOTE ADULT - PROVIDER SPECIALTY LIST ADULT
CCU
Critical Care
Critical Care
Gastroenterology
Hospitalist
Internal Medicine
Surgery
Surgery
Gastroenterology
Internal Medicine

## 2018-07-18 NOTE — PROGRESS NOTE ADULT - ASSESSMENT
52yo male with history of HTN, DLD, DM presented with BRBPR    Lower GI Bleed likely sec to diverticulosis- s/p EGD - Normal , Colonoscopy - blood in colon , diverticulosis, no active source found, no intervention done.  Last Bloody BM was > 24 hrs ago - Hb stable at 8.0.   Advance diet to regular.   If tolerated and no further episodes of bloody BM , Pt can follow up with GI as an outpatient.  Recall us PRN    Discussed with attending. 54yo male with history of HTN, DLD, DM presented with BRBPR    Lower GI Bleed likely sec to diverticulosis- s/p EGD - Normal , Colonoscopy - blood in colon , diverticulosis, no active source found, no intervention done.  Last Bloody BM was > 24 hrs ago - Hb stable at 8.0.   Advance diet to regular.   If tolerated and no further episodes of bloody BM , Pt can follow up with GI as an outpatient.  Recall us PRN

## 2018-07-31 DIAGNOSIS — I10 ESSENTIAL (PRIMARY) HYPERTENSION: ICD-10-CM

## 2018-07-31 DIAGNOSIS — K57.31 DIVERTICULOSIS OF LARGE INTESTINE WITHOUT PERFORATION OR ABSCESS WITH BLEEDING: ICD-10-CM

## 2018-07-31 DIAGNOSIS — E78.5 HYPERLIPIDEMIA, UNSPECIFIED: ICD-10-CM

## 2018-07-31 DIAGNOSIS — E87.6 HYPOKALEMIA: ICD-10-CM

## 2018-07-31 DIAGNOSIS — Z79.84 LONG TERM (CURRENT) USE OF ORAL HYPOGLYCEMIC DRUGS: ICD-10-CM

## 2018-07-31 DIAGNOSIS — K64.4 RESIDUAL HEMORRHOIDAL SKIN TAGS: ICD-10-CM

## 2018-07-31 DIAGNOSIS — K62.5 HEMORRHAGE OF ANUS AND RECTUM: ICD-10-CM

## 2018-07-31 DIAGNOSIS — Z60.2 PROBLEMS RELATED TO LIVING ALONE: ICD-10-CM

## 2018-07-31 DIAGNOSIS — K64.8 OTHER HEMORRHOIDS: ICD-10-CM

## 2018-07-31 DIAGNOSIS — E78.1 PURE HYPERGLYCERIDEMIA: ICD-10-CM

## 2018-07-31 DIAGNOSIS — D62 ACUTE POSTHEMORRHAGIC ANEMIA: ICD-10-CM

## 2018-07-31 DIAGNOSIS — E66.9 OBESITY, UNSPECIFIED: ICD-10-CM

## 2018-07-31 DIAGNOSIS — E11.9 TYPE 2 DIABETES MELLITUS WITHOUT COMPLICATIONS: ICD-10-CM

## 2018-07-31 SDOH — SOCIAL STABILITY - SOCIAL INSECURITY: PROBLEMS RELATED TO LIVING ALONE: Z60.2

## 2018-08-15 ENCOUNTER — OUTPATIENT (OUTPATIENT)
Dept: OUTPATIENT SERVICES | Facility: HOSPITAL | Age: 53
LOS: 1 days | Discharge: HOME | End: 2018-08-15

## 2018-08-15 DIAGNOSIS — E55.9 VITAMIN D DEFICIENCY, UNSPECIFIED: ICD-10-CM

## 2018-08-15 DIAGNOSIS — Z90.49 ACQUIRED ABSENCE OF OTHER SPECIFIED PARTS OF DIGESTIVE TRACT: Chronic | ICD-10-CM

## 2018-08-15 DIAGNOSIS — D64.9 ANEMIA, UNSPECIFIED: ICD-10-CM

## 2018-08-16 PROBLEM — E11.9 TYPE 2 DIABETES MELLITUS WITHOUT COMPLICATIONS: Chronic | Status: ACTIVE | Noted: 2018-07-11

## 2018-08-16 PROBLEM — E78.00 PURE HYPERCHOLESTEROLEMIA, UNSPECIFIED: Chronic | Status: ACTIVE | Noted: 2018-07-11

## 2018-08-16 PROBLEM — I10 ESSENTIAL (PRIMARY) HYPERTENSION: Chronic | Status: ACTIVE | Noted: 2018-07-11

## 2018-08-20 NOTE — H&P ADULT - EXTREMITIES
[de-identified] : PT STILL USING VICTOZA EVERY OTHER DAY ,DOES NOT LIKE INJECTIONS AND FEEL THAT IT MAKES HIM NAUSEOUS AND DECREASES HIS APPETITE BUT HAD NOT LOST WT,HAD 2 EPISODES OF HYPOGLYCEMIA LAST 2 MONTHS,MILD SX
No cyanosis, clubbing or edema

## 2018-10-17 NOTE — PATIENT PROFILE ADULT. - LIMITATIONS ON VISITORS/PHONE CALLS
BARBY SEE Incoming Telephone Call  For Supported Employment & Education    NAVIGATE Enrollee: Jamila Velasquez (1996)     MRN: [id number]  Brandon Son  
none

## 2023-11-22 NOTE — PRE-ANESTHESIA EVALUATION ADULT - NSATTENDATTESTRD_GEN_ALL_CORE
22-Nov-2023 12:36
The patient has been re-examined and I agree with the above assessment or I updated with my findings.